# Patient Record
Sex: MALE | Race: WHITE | NOT HISPANIC OR LATINO | Employment: FULL TIME | URBAN - METROPOLITAN AREA
[De-identification: names, ages, dates, MRNs, and addresses within clinical notes are randomized per-mention and may not be internally consistent; named-entity substitution may affect disease eponyms.]

---

## 2020-03-07 ENCOUNTER — OFFICE VISIT (OUTPATIENT)
Dept: URGENT CARE | Facility: URGENT CARE | Age: 55
End: 2020-03-07
Payer: COMMERCIAL

## 2020-03-07 VITALS
DIASTOLIC BLOOD PRESSURE: 78 MMHG | SYSTOLIC BLOOD PRESSURE: 128 MMHG | HEART RATE: 67 BPM | TEMPERATURE: 98.2 F | RESPIRATION RATE: 18 BRPM | WEIGHT: 305.2 LBS | OXYGEN SATURATION: 98 %

## 2020-03-07 DIAGNOSIS — M10.9 ACUTE GOUT INVOLVING TOE OF RIGHT FOOT, UNSPECIFIED CAUSE: Primary | ICD-10-CM

## 2020-03-07 DIAGNOSIS — M79.674 PAIN OF TOE OF RIGHT FOOT: ICD-10-CM

## 2020-03-07 LAB
BASOPHILS # BLD AUTO: 0 10E9/L (ref 0–0.2)
BASOPHILS NFR BLD AUTO: 0.5 %
DIFFERENTIAL METHOD BLD: NORMAL
EOSINOPHIL # BLD AUTO: 0.3 10E9/L (ref 0–0.7)
EOSINOPHIL NFR BLD AUTO: 2.9 %
ERYTHROCYTE [DISTWIDTH] IN BLOOD BY AUTOMATED COUNT: 13.8 % (ref 10–15)
HCT VFR BLD AUTO: 46.7 % (ref 40–53)
HGB BLD-MCNC: 16.1 G/DL (ref 13.3–17.7)
LYMPHOCYTES # BLD AUTO: 1.3 10E9/L (ref 0.8–5.3)
LYMPHOCYTES NFR BLD AUTO: 15.2 %
MCH RBC QN AUTO: 31.7 PG (ref 26.5–33)
MCHC RBC AUTO-ENTMCNC: 34.5 G/DL (ref 31.5–36.5)
MCV RBC AUTO: 92 FL (ref 78–100)
MONOCYTES # BLD AUTO: 1 10E9/L (ref 0–1.3)
MONOCYTES NFR BLD AUTO: 11.6 %
NEUTROPHILS # BLD AUTO: 6 10E9/L (ref 1.6–8.3)
NEUTROPHILS NFR BLD AUTO: 69.8 %
PLATELET # BLD AUTO: 217 10E9/L (ref 150–450)
RBC # BLD AUTO: 5.08 10E12/L (ref 4.4–5.9)
URATE SERPL-MCNC: 8.6 MG/DL (ref 3.5–7.2)
WBC # BLD AUTO: 8.6 10E9/L (ref 4–11)

## 2020-03-07 PROCEDURE — 84550 ASSAY OF BLOOD/URIC ACID: CPT | Performed by: PHYSICIAN ASSISTANT

## 2020-03-07 PROCEDURE — 36415 COLL VENOUS BLD VENIPUNCTURE: CPT | Performed by: PHYSICIAN ASSISTANT

## 2020-03-07 PROCEDURE — 99203 OFFICE O/P NEW LOW 30 MIN: CPT | Performed by: PHYSICIAN ASSISTANT

## 2020-03-07 PROCEDURE — 85025 COMPLETE CBC W/AUTO DIFF WBC: CPT | Performed by: PHYSICIAN ASSISTANT

## 2020-03-07 RX ORDER — HYDROCHLOROTHIAZIDE 25 MG/1
TABLET ORAL
COMMUNITY
Start: 2020-03-07 | End: 2023-05-23

## 2020-03-07 RX ORDER — METOPROLOL SUCCINATE 100 MG/1
50 TABLET, EXTENDED RELEASE ORAL
COMMUNITY
Start: 2020-03-07 | End: 2023-05-23

## 2020-03-07 RX ORDER — INDOMETHACIN 50 MG/1
50 CAPSULE ORAL
Qty: 120 CAPSULE | Refills: 0 | Status: SHIPPED | OUTPATIENT
Start: 2020-03-07 | End: 2023-05-23

## 2020-03-07 RX ORDER — LISINOPRIL 40 MG/1
TABLET ORAL
COMMUNITY
Start: 2020-02-05 | End: 2023-05-23

## 2020-03-07 SDOH — HEALTH STABILITY: MENTAL HEALTH: HOW OFTEN DO YOU HAVE A DRINK CONTAINING ALCOHOL?: MONTHLY OR LESS

## 2020-03-07 ASSESSMENT — ENCOUNTER SYMPTOMS: ARTHRALGIAS: 1

## 2020-03-07 NOTE — PROGRESS NOTES
SUBJECTIVE:   Abbe Fuentes is a 54 year old male presenting with a chief complaint of   Chief Complaint   Patient presents with     Musculoskeletal Problem     x 2 days right big toe has been painful and got worst last night may now be swollen also       He is new patient of [facility].  Patient presents with complaints of right foot big toe pain x 2 days.  No fevers or trauma.  No history of gout.      MS Injury/Pain    Onset of symptoms was 2 day(s) ago.  Location: right toe(s) great  Context:       The injury happened while none      Mechanism: none      Patient experienced   Course of symptoms is same.    Severity moderate  Current and Associated symptoms: Pain, Swelling and Redness  Denies  Bruising  Aggravating Factors: all  Therapies to improve symptoms include: none  This is the first time this type of problem has occurred for this patient.       Review of Systems   Musculoskeletal: Positive for arthralgias.   All other systems reviewed and are negative.      No past medical history on file.  No family history on file.  Current Outpatient Medications   Medication Sig Dispense Refill     CETIRIZINE HCL PO        hydrochlorothiazide (HYDRODIURIL) 25 MG tablet        indomethacin (INDOCIN) 50 MG capsule Take 1 capsule (50 mg) by mouth 3 times daily (with meals) 120 capsule 0     lisinopril (ZESTRIL) 40 MG tablet        metoprolol succinate ER (TOPROL-XL) 100 MG 24 hr tablet 50 mg       Multiple Vitamins-Minerals (MULTIVITAMIN ADULTS PO) Take 1 tablet by mouth       omeprazole (PRILOSEC) 20 MG DR capsule Take 1 capsule by mouth       Social History     Tobacco Use     Smoking status: Former Smoker     Smokeless tobacco: Never Used   Substance Use Topics     Alcohol use: Yes     Frequency: Monthly or less       OBJECTIVE  /78   Pulse 67   Temp 98.2  F (36.8  C) (Tympanic)   Resp 18   Wt 138.4 kg (305 lb 3.2 oz)   SpO2 98%     Physical Exam  Vitals signs and nursing note reviewed.   Constitutional:        Appearance: Normal appearance. He is obese.   Musculoskeletal:      Comments: Full ROM at right foot digits, great toe, DIP mildly erythematous and edematous.  Tender to palpation.  DP pulses present.   Skin:     General: Skin is warm.      Capillary Refill: Capillary refill takes less than 2 seconds.      Findings: Erythema present.   Neurological:      General: No focal deficit present.      Mental Status: He is alert.   Psychiatric:         Mood and Affect: Mood normal.         Labs:  Results for orders placed or performed in visit on 03/07/20 (from the past 24 hour(s))   CBC with platelets and differential   Result Value Ref Range    WBC 8.6 4.0 - 11.0 10e9/L    RBC Count 5.08 4.4 - 5.9 10e12/L    Hemoglobin 16.1 13.3 - 17.7 g/dL    Hematocrit 46.7 40.0 - 53.0 %    MCV 92 78 - 100 fl    MCH 31.7 26.5 - 33.0 pg    MCHC 34.5 31.5 - 36.5 g/dL    RDW 13.8 10.0 - 15.0 %    Platelet Count 217 150 - 450 10e9/L    % Neutrophils 69.8 %    % Lymphocytes 15.2 %    % Monocytes 11.6 %    % Eosinophils 2.9 %    % Basophils 0.5 %    Absolute Neutrophil 6.0 1.6 - 8.3 10e9/L    Absolute Lymphocytes 1.3 0.8 - 5.3 10e9/L    Absolute Monocytes 1.0 0.0 - 1.3 10e9/L    Absolute Eosinophils 0.3 0.0 - 0.7 10e9/L    Absolute Basophils 0.0 0.0 - 0.2 10e9/L    Diff Method Automated Method    Uric acid   Result Value Ref Range    Uric Acid 8.6 (H) 3.5 - 7.2 mg/dL       X-Ray was not done.    ASSESSMENT:      ICD-10-CM    1. Acute gout involving toe of right foot, unspecified cause M10.9 indomethacin (INDOCIN) 50 MG capsule   2. Pain of toe of right foot M79.674 CBC with platelets and differential     Uric acid     indomethacin (INDOCIN) 50 MG capsule        Medical Decision Making:    Differential Diagnosis:  MS Injury Pain: gout    Serious Comorbid Conditions:  Adult:  None    PLAN:    MS Injury/Pain  indomethicine    Followup:    If not improving or if condition worsens, follow up with your Primary Care Provider    Patient Instructions        Patient Education     Treating Gout Attacks     Raising the joint above the level of your heart can help reduce gout symptoms.     Gout is a disease that affects the joints. It is caused by excess uric acid in your blood that may lead to crystals forming in your joints. Left untreated, it can lead to painful foot and joint deformities and even kidney problems. But, by treating gout early, you can relieve pain and help prevent future problems. Gout can usually be treated with medicine and proper diet. In severe cases, surgery may be needed.  Gout attacks are painful and often happen more than once. Taking medicines may reduce pain and prevent attacks in the future. There are also some things you can do at home to relieve symptoms.  Medicines for gout  Your healthcare provider may prescribe a daily medicine to reduce levels of uric acid. Reducing your uric acid levels may help prevent gout attacks. Allopurinol is one commonly used medicine taken daily to reduce uric acid levels. Other daily medicines used to reduce uric acid levels include febuxostat, lesinurad, and probencid. Other medicines can help relieve pain and swelling during an acute attack. Medicines such as NSAIDs (nonsteroidal anti-inflammatory medicines), steroids, and colchicine may be prescribed for intermittent use to relieve an acute gout attack. Be sure to take your medicine as directed.  What you can do  Below are some things you can do at home to relieve gout symptoms. Your healthcare provider may have other tips.    Rest the painful joint as much as you can.    Raise the painful joint so it is at a level higher than your heart.    Use ice for 10 minutes every 1 to 2 hours as possible.  How can I prevent gout?  With a little effort, you may be able to prevent gout attacks in the future. Here are some things you can do:    Don't eat foods high in purines  ? Certain meats (red meat, processed meat, turkey)  ? Organ meats (kidney, liver,  sweetbread)  ? Shellfish (lobster, crab, shrimp, scallop, mussel)  ? Certain fish (anchovy, sardine, herring, mackerel)    Take any medicines prescribed by your healthcare provider.    Lose weight if you need to.    Reduce high fructose corn syrup in meals and drinks.    Reduce or cut out alcohol, particularly beer, but also red wine and spirits.    Control blood pressure, diabetes, and cholesterol.    Drink plenty of water to help flush uric acid from your body.  Date Last Reviewed: 4/1/2018 2000-2019 [employer]. [address] [place] [address]. All rights reserved. This information is not intended as a substitute for professional medical care. Always follow your healthcare professional's instructions.           Patient Education     What is Gout?  Gout is a disease that affects the joints. Left untreated, it can lead to painful foot and joint deformities and even kidney problems. But, by treating gout early, you can relieve pain and help prevent future problems. Gout can usually be treated with medicine and proper diet. In severe cases, surgery may be needed.  What causes gout?  Gout is caused by an excess of uric acid (a waste product made by the body). Uric acid is excreted by the kidneys. If the uric acid level in your blood rises too high, the uric acid may form crystals that collect in the joints, bringing on a gout attack. If you have many gout attacks, crystals may form large deposits called tophi. Tophi can damage joints and cause deformity.  Who is at risk for gout?  Men are more likely to have gout than women. But women can also be affected, mostly after menopause. Some health problems, such as obesity and high cholesterol, make gout more likely. And some medicines, such as diuretics ( water pills ), can trigger a gout attack. People who drink a lot of alcohol are at high risk for gout. Certain foods can also trigger a gout attack.  Substances that may trigger a gout  "attack  To help prevent a gout attack, avoid these foods:    Alcohol (particularly beer, but also red wine and spirits)    Certain meats (red meat, processed meat, turkey)    Organ meats (kidney, liver, sweetbread)    Shellfish (lobster, crab, shrimp, scallop, mussel)    Certain fish (anchovy, sardine, herring, mackerel)   Treatment    Lifestyle changes, including weight loss, exercise, and quitting tobacco use    Reducing consumption of the food groups above as well as high fructose corn syrup, found in many foods including sodas and energy drinks    Changing non-essential medicines that may contribute to gout (such as thiazide diuretics--\"water pills\")    Medicines to reduce the amount of uric acid in the blood, such as allopurinol, probencid, febuxostat, and lesinurad.    Medicines to treat acute gout attacks, including NSAIDs (nonsteroidal anti-inflammatory medicines), steroids, and colchicine    Date Last Reviewed: 4/1/2018 2000-2019 [employer]. [address] [place] [address], [place] [address]. All rights reserved. This information is not intended as a substitute for professional medical care. Always follow your healthcare professional's instructions.                 "

## 2020-03-07 NOTE — PATIENT INSTRUCTIONS
Patient Education     Treating Gout Attacks     Raising the joint above the level of your heart can help reduce gout symptoms.     Gout is a disease that affects the joints. It is caused by excess uric acid in your blood that may lead to crystals forming in your joints. Left untreated, it can lead to painful foot and joint deformities and even kidney problems. But, by treating gout early, you can relieve pain and help prevent future problems. Gout can usually be treated with medicine and proper diet. In severe cases, surgery may be needed.  Gout attacks are painful and often happen more than once. Taking medicines may reduce pain and prevent attacks in the future. There are also some things you can do at home to relieve symptoms.  Medicines for gout  Your healthcare provider may prescribe a daily medicine to reduce levels of uric acid. Reducing your uric acid levels may help prevent gout attacks. Allopurinol is one commonly used medicine taken daily to reduce uric acid levels. Other daily medicines used to reduce uric acid levels include febuxostat, lesinurad, and probencid. Other medicines can help relieve pain and swelling during an acute attack. Medicines such as NSAIDs (nonsteroidal anti-inflammatory medicines), steroids, and colchicine may be prescribed for intermittent use to relieve an acute gout attack. Be sure to take your medicine as directed.  What you can do  Below are some things you can do at home to relieve gout symptoms. Your healthcare provider may have other tips.    Rest the painful joint as much as you can.    Raise the painful joint so it is at a level higher than your heart.    Use ice for 10 minutes every 1 to 2 hours as possible.  How can I prevent gout?  With a little effort, you may be able to prevent gout attacks in the future. Here are some things you can do:    Don't eat foods high in purines  ? Certain meats (red meat, processed meat, turkey)  ? Organ meats (kidney, liver,  sweetbread)  ? Shellfish (lobster, crab, shrimp, scallop, mussel)  ? Certain fish (anchovy, sardine, herring, mackerel)    Take any medicines prescribed by your healthcare provider.    Lose weight if you need to.    Reduce high fructose corn syrup in meals and drinks.    Reduce or cut out alcohol, particularly beer, but also red wine and spirits.    Control blood pressure, diabetes, and cholesterol.    Drink plenty of water to help flush uric acid from your body.  Date Last Reviewed: 4/1/2018 2000-2019 [employer]. [address] [place] [address]. All rights reserved. This information is not intended as a substitute for professional medical care. Always follow your healthcare professional's instructions.           Patient Education     What is Gout?  Gout is a disease that affects the joints. Left untreated, it can lead to painful foot and joint deformities and even kidney problems. But, by treating gout early, you can relieve pain and help prevent future problems. Gout can usually be treated with medicine and proper diet. In severe cases, surgery may be needed.  What causes gout?  Gout is caused by an excess of uric acid (a waste product made by the body). Uric acid is excreted by the kidneys. If the uric acid level in your blood rises too high, the uric acid may form crystals that collect in the joints, bringing on a gout attack. If you have many gout attacks, crystals may form large deposits called tophi. Tophi can damage joints and cause deformity.  Who is at risk for gout?  Men are more likely to have gout than women. But women can also be affected, mostly after menopause. Some health problems, such as obesity and high cholesterol, make gout more likely. And some medicines, such as diuretics ( water pills ), can trigger a gout attack. People who drink a lot of alcohol are at high risk for gout. Certain foods can also trigger a gout attack.  Substances that may trigger a gout  "attack  To help prevent a gout attack, avoid these foods:    Alcohol (particularly beer, but also red wine and spirits)    Certain meats (red meat, processed meat, turkey)    Organ meats (kidney, liver, sweetbread)    Shellfish (lobster, crab, shrimp, scallop, mussel)    Certain fish (anchovy, sardine, herring, mackerel)   Treatment    Lifestyle changes, including weight loss, exercise, and quitting tobacco use    Reducing consumption of the food groups above as well as high fructose corn syrup, found in many foods including sodas and energy drinks    Changing non-essential medicines that may contribute to gout (such as thiazide diuretics--\"water pills\")    Medicines to reduce the amount of uric acid in the blood, such as allopurinol, probencid, febuxostat, and lesinurad.    Medicines to treat acute gout attacks, including NSAIDs (nonsteroidal anti-inflammatory medicines), steroids, and colchicine    Date Last Reviewed: 4/1/2018 2000-2019 [employer]. [address] [place] [address], [place] [address]. All rights reserved. This information is not intended as a substitute for professional medical care. Always follow your healthcare professional's instructions.           "

## 2020-09-29 ENCOUNTER — VIRTUAL VISIT (OUTPATIENT)
Dept: FAMILY MEDICINE | Facility: OTHER | Age: 55
End: 2020-09-29

## 2020-09-29 ENCOUNTER — AMBULATORY - HEALTHEAST (OUTPATIENT)
Dept: FAMILY MEDICINE | Facility: CLINIC | Age: 55
End: 2020-09-29

## 2020-09-29 DIAGNOSIS — Z20.822 SUSPECTED COVID-19 VIRUS INFECTION: ICD-10-CM

## 2020-09-29 NOTE — PROGRESS NOTES
"Date: 2020 15:56:18  Clinician: Janessa Alicea  Clinician NPI: [id number]  Patient: Abbe Fuentes  Patient : 1965  Patient Address: [address] [place] [address]  Patient Phone: [phone]  Visit Protocol: URI  Patient Summary:  Abbe is a 54 year old ( : 1965 ) male who initiated a [facility] Visit for COVID-19 (Coronavirus) evaluation and screening. When asked the question \"Please sign me up to receive news, health information and promotions. \", Abbe responded \"No\".    When asked when his symptoms started, Abbe reported that he does not have any symptoms.   He denies taking antibiotic medication in the past month and having recent facial or sinus surgery in the past 60 days.    Pertinent COVID-19 (Coronavirus) information  In the past 14 days, bAbe has not worked in a congregate living setting.   He does not work or volunteer as healthcare worker or a  and does not work or volunteer in a healthcare facility.   Abbe also has not lived in a congregate living setting in the past 14 days. He does not live with a healthcare worker.   Abbe has had a close contact with a laboratory-confirmed COVID-19 patient in the last 14 days. Additional information about contact with COVID-19 (Coronavirus) patient as reported by the patient (free text): My girlfriend is a health care worker in [place], [place]. She spent the weekend (Thursday-) with me, then on Monday was informed that one of her co workers tested positive. She then got herself tested, and that came back positive.   Patient reported they are not living in the same household with a COVID-19 positive patient.  Patient was in an enclosed space for greater than 15 minutes with a COVID-19 patient.  Since 2019, Abbe and has not had upper respiratory infection or influenza-like illness. Has not been diagnosed with lab-confirmed COVID-19 test   Pertinent medical history  Abbe does not need " a return to work/school note.   Weight: 286 lbs   Abbe does not smoke or use smokeless tobacco.   Weight: 286 lbs    MEDICATIONS: metoprolol succinate oral, lisinopril oral, hydrochlorothiazide oral, atorvastatin oral, aspirin oral, omeprazole oral, cetirizine oral, Daily Multivitamin-Minerals oral, ALLERGIES: NKDA  Clinician Response:  Dear Abbe,   Based on your exposure to COVID-19 (coronavirus), we would like to test you for this virus.  1. Please call [phone] to schedule your visit. Explain that you were referred by [facility] to have a COVID-19 test. Be [facility].  The following will serve as your written order for this COVID Test, ordered by me, for the indication of suspected COVID [Z20.828]: The test will be ordered in [employer], our electronic health record, after you are scheduled. It will show as ordered and authorized by Nikhil Linda MD.  Order: COVID-19 (coronavirus) PCR for ASYMPTOMATIC EXPOSURE testing from [facility].  If you know you have had close contact with someone who tested positive, you should be quarantined for 14 days after this exposure. You should stay in quarantine for the14 days even if the covid test is negative, the optimal time to test after exposure is 5-7 days from the exposure  Quarantine means   What should I do?  For safety, it's very important to follow these rules. Do this for 14 days after the date you were last exposed to the virus..  Stay home and away from others. Don't go to school or anywhere else. Generally quarantine means staying home from work but there are some exceptions to this. Please contact your workplace.   No hugging, kissing or shaking hands.  Don't let anyone visit.  Cover your mouth and nose with a mask, tissue or washcloth to avoid spreading germs.  Wash your hands and face often. Use soap and water.  What are the symptoms of COVID-19?  The most common symptoms are cough, fever and trouble breathing. Less common symptoms  include headache, body aches, fatigue (feeling very tired), chills, sore throat, stuffy or runny nose, diarrhea (loose poop), loss of taste or smell, belly pain, and nausea or vomiting (feeling sick to your stomach or throwing up).  After 14 days, if you have still don't have symptoms, you likely don't have this virus.  If you develop symptoms, follow these guidelines.  If you're normally healthy: Please start another [facility] visit to report your symptoms. Go to [email or web address].  If you have a serious health problem (like cancer, heart failure, an organ transplant or kidney disease): Call your specialty clinic. Let them know that you might have COVID-19.  2. When it's time for your COVID test:  Stay at least 6 feet away from others. (If someone will drive you to your test, stay in the backseat, as far away from the  as you can.)  Cover your mouth and nose with a mask, tissue or washcloth.  Go straight to the testing site. Don't make any stops on the way there or back.  Please note  Caregivers in these groups are at risk for severe illness due to COVID-19:  o People 65 years and older  o People who live in a nursing home or long-term care facility  o People with chronic disease (lung, heart, cancer, diabetes, kidney, liver, immunologic)  o People who have a weakened immune system, including those who:  Are in cancer treatment  Take medicine that weakens the immune system, such as corticosteroids  Had a bone marrow or organ transplant  Have an immune deficiency  Have poorly controlled HIV or AIDS  Are obese (body mass index of 40 or higher)  Smoke regularly  Where can I get more information?   [email or web address] -- About COVID-19: [email or web address]  CDC -- What to Do If You're Sick: www.cdc.gov/coronavirus/2019-ncov/about/steps-when-sick.html  CDC -- Ending Home Isolation: www.cdc.gov/coronavirus/2019-ncov/hcp/disposition-in-home-patients.html  CDC -- Caring for Someone:  www.cdc.gov/coronavirus/2019-ncov/if-you-are-sick/care-for-someone.html  [facility] -- Interim Guidance for Hospital Discharge to Home: www.health.[facility].[place]/diseases/coronavirus/hcp/hospdischarge.pdf  [facility] clinical trials (COVID-19 research studies): clinicalaffairs.[facility]-clinical-trials  Below are the COVID-19 hotlines at the [place] Department of Health ([facility]). Interpreters are available.  For health questions: Call 478-648-6414 or 1-777.232.4881 (7 a.m. to 7 p.m.)  For questions about schools and childcare: Call 926-976-9482 or 1-806.556.9783 (7 a.m. to 7 p.m.)    COVID-19 (Coronavirus) General Information  Because there is currently no vaccine to prevent infection, the best way to protect yourself is to avoid being exposed to this virus. Common symptoms of COVID-19 include but are not limited to fever, cough, and shortness of breath. These symptoms appear 2-14 days after you are exposed to the virus that causes COVID-19. Click here for more information from the CDC on how to protect yourself.  If you are sick with COVID-19 or suspect you are infected with the virus that causes COVID-19, follow the steps here from the CDC to help prevent the disease from spreading to people in your home and community.  Click here for general information from the CDC on testing.  If you develop any of these emergency warning signs for COVID-19, get medical attention immediately:     Trouble breathing    Persistent pain or pressure in the chest    New confusion or inability to arouse    Bluish lips or face      Call your doctor or clinic before going in. Call 911 if you have a medical emergency and notify the  you have or think you may have COVID-19.  For more detailed and up to date information on COVID-19 (Coronavirus), please visit the CDC website.   Diagnosis: Contact with and (suspected) exposure to other viral communicable diseases  Diagnosis ICD: Z20.828

## 2020-09-30 ENCOUNTER — AMBULATORY - HEALTHEAST (OUTPATIENT)
Dept: FAMILY MEDICINE | Facility: CLINIC | Age: 55
End: 2020-09-30

## 2020-09-30 DIAGNOSIS — Z20.822 SUSPECTED COVID-19 VIRUS INFECTION: ICD-10-CM

## 2020-10-02 ENCOUNTER — COMMUNICATION - HEALTHEAST (OUTPATIENT)
Dept: SCHEDULING | Facility: CLINIC | Age: 55
End: 2020-10-02

## 2020-10-05 ENCOUNTER — COMMUNICATION - HEALTHEAST (OUTPATIENT)
Dept: SCHEDULING | Facility: CLINIC | Age: 55
End: 2020-10-05

## 2020-12-06 ENCOUNTER — HEALTH MAINTENANCE LETTER (OUTPATIENT)
Age: 55
End: 2020-12-06

## 2021-09-25 ENCOUNTER — HEALTH MAINTENANCE LETTER (OUTPATIENT)
Age: 56
End: 2021-09-25

## 2022-01-15 ENCOUNTER — HEALTH MAINTENANCE LETTER (OUTPATIENT)
Age: 57
End: 2022-01-15

## 2023-01-07 ENCOUNTER — HEALTH MAINTENANCE LETTER (OUTPATIENT)
Age: 58
End: 2023-01-07

## 2023-02-19 ENCOUNTER — APPOINTMENT (OUTPATIENT)
Dept: GENERAL RADIOLOGY | Facility: CLINIC | Age: 58
End: 2023-02-19
Attending: EMERGENCY MEDICINE
Payer: COMMERCIAL

## 2023-02-19 ENCOUNTER — HOSPITAL ENCOUNTER (EMERGENCY)
Facility: CLINIC | Age: 58
Discharge: HOME | End: 2023-02-19
Attending: EMERGENCY MEDICINE | Admitting: EMERGENCY MEDICINE
Payer: COMMERCIAL

## 2023-02-19 VITALS
DIASTOLIC BLOOD PRESSURE: 82 MMHG | OXYGEN SATURATION: 95 % | HEART RATE: 64 BPM | RESPIRATION RATE: 20 BRPM | SYSTOLIC BLOOD PRESSURE: 128 MMHG | TEMPERATURE: 98.9 F

## 2023-02-19 DIAGNOSIS — J20.9 ACUTE BRONCHITIS WITH SYMPTOMS > 10 DAYS: ICD-10-CM

## 2023-02-19 DIAGNOSIS — J98.01 ACUTE BRONCHOSPASM: ICD-10-CM

## 2023-02-19 LAB
FLUAV RNA SPEC QL NAA+PROBE: NEGATIVE
FLUBV RNA RESP QL NAA+PROBE: NEGATIVE
RSV RNA SPEC NAA+PROBE: NEGATIVE
SARS-COV-2 RNA RESP QL NAA+PROBE: NEGATIVE

## 2023-02-19 PROCEDURE — 87637 SARSCOV2&INF A&B&RSV AMP PRB: CPT | Performed by: EMERGENCY MEDICINE

## 2023-02-19 PROCEDURE — 71046 X-RAY EXAM CHEST 2 VIEWS: CPT

## 2023-02-19 PROCEDURE — 250N000013 HC RX MED GY IP 250 OP 250 PS 637: Performed by: EMERGENCY MEDICINE

## 2023-02-19 PROCEDURE — 99284 EMERGENCY DEPT VISIT MOD MDM: CPT | Mod: CS,25

## 2023-02-19 PROCEDURE — 250N000012 HC RX MED GY IP 250 OP 636 PS 637: Performed by: EMERGENCY MEDICINE

## 2023-02-19 PROCEDURE — C9803 HOPD COVID-19 SPEC COLLECT: HCPCS

## 2023-02-19 PROCEDURE — 94640 AIRWAY INHALATION TREATMENT: CPT

## 2023-02-19 RX ORDER — ALBUTEROL SULFATE 90 UG/1
2-4 AEROSOL, METERED RESPIRATORY (INHALATION) EVERY 4 HOURS PRN
Qty: 8 G | Refills: 1 | Status: SHIPPED | OUTPATIENT
Start: 2023-02-19 | End: 2023-05-23

## 2023-02-19 RX ORDER — PREDNISONE 20 MG/1
40 TABLET ORAL ONCE
Status: COMPLETED | OUTPATIENT
Start: 2023-02-19 | End: 2023-02-19

## 2023-02-19 RX ORDER — PREDNISONE 20 MG/1
40 TABLET ORAL DAILY
Qty: 8 TABLET | Refills: 0 | Status: SHIPPED | OUTPATIENT
Start: 2023-02-19 | End: 2023-02-23

## 2023-02-19 RX ORDER — BENZONATATE 200 MG/1
200 CAPSULE ORAL 3 TIMES DAILY PRN
Qty: 21 CAPSULE | Refills: 0 | Status: SHIPPED | OUTPATIENT
Start: 2023-02-19 | End: 2023-02-26

## 2023-02-19 RX ORDER — ALBUTEROL SULFATE 90 UG/1
4 AEROSOL, METERED RESPIRATORY (INHALATION) ONCE
Status: COMPLETED | OUTPATIENT
Start: 2023-02-19 | End: 2023-02-19

## 2023-02-19 RX ORDER — DOXYCYCLINE 100 MG/1
100 CAPSULE ORAL 2 TIMES DAILY
Qty: 14 CAPSULE | Refills: 0 | Status: SHIPPED | OUTPATIENT
Start: 2023-02-19 | End: 2023-02-26

## 2023-02-19 RX ORDER — DOXYCYCLINE 100 MG/1
100 CAPSULE ORAL ONCE
Status: COMPLETED | OUTPATIENT
Start: 2023-02-19 | End: 2023-02-19

## 2023-02-19 RX ADMIN — DOXYCYCLINE HYCLATE 100 MG: 100 CAPSULE ORAL at 17:16

## 2023-02-19 RX ADMIN — PREDNISONE 40 MG: 20 TABLET ORAL at 17:16

## 2023-02-19 RX ADMIN — ALBUTEROL SULFATE 4 PUFF: 90 AEROSOL, METERED RESPIRATORY (INHALATION) at 17:16

## 2023-02-19 ASSESSMENT — ACTIVITIES OF DAILY LIVING (ADL): ADLS_ACUITY_SCORE: 35

## 2023-02-19 ASSESSMENT — ENCOUNTER SYMPTOMS
SORE THROAT: 1
FEVER: 0
COUGH: 1

## 2023-02-19 NOTE — ED PROVIDER NOTES
History     Chief Complaint:  Cough       HPI   Abbe Fuentes is a 57 year old male with a history of diabetes, HTN, and dyslipidmeia who presents with a cough. The patient states that for the past 10 days he has been sick with a cough. This first began when he woke up from his sleep coughing up a lot of phlegm. He also has a sore throat and lots of congestion. He denies a fever. He denies recent travel. He uses nasal saline for congestion issues.      Independent Historian:   None - Patient Only    Review of External Notes:      ROS:  Review of Systems   Constitutional: Negative for fever.   HENT: Positive for congestion and sore throat.    Respiratory: Positive for cough.    All other systems reviewed and are negative.      Allergies:  No Known Allergies     Medications:    albuterol  benzonatate  doxycycline hyclate   hydrochlorothiazide   indomethacin   lisinopril   metoprolol succinate  omeprazole    Past Medical History:    Type 2 diabetes  Dyslipidemia  Obesity  Seasonal allergies  HTN  SUZETTE  GERD  Hiatal hernia    Past Surgical History:    Adenoidectomy     Family History:    Father-HTN, lung cancer    Social History:  The patient presents to the ED alone.    Physical Exam     Patient Vitals for the past 24 hrs:   BP Temp Temp src Pulse Resp SpO2   02/19/23 1650 128/82 -- -- 64 20 95 %   02/19/23 1643 (!) 146/79 98.9  F (37.2  C) Oral 69 18 96 %        Physical Exam    HEENT:         Normal conjunctiva, No  discharge           R TM  external ear and EAC normal         L  TM ,external ear and EAC normal           Posterior oropharynx:               Yes erythema,               No exudates,               Tonsillar hypertrophy - no              uvula midline - Yes    Neck: no adenopathy      Lungs:     Frequent cough, scattered expiratory wheezes and prolonged expiratory phase    Heart: Regular, no m/r/g, ppi    Neuro: alert, no focal gross focal deficits    Psych: Normal mood and  affect            Emergency Department Course     Imaging:  Chest XR,  PA & LAT   Final Result   IMPRESSION: Heart is enlarged. The lungs are clear. No infiltrate, pleural effusion, or pneumothorax.         Report per radiology    Laboratory:  Labs Ordered and Resulted from Time of ED Arrival to Time of ED Departure   INFLUENZA A/B & SARS-COV2 PCR MULTIPLEX - Normal       Result Value    Influenza A PCR Negative      Influenza B PCR Negative      RSV PCR Negative      SARS CoV2 PCR Negative        Emergency Department Course & Assessments:    Interventions:  Medications   albuterol (PROVENTIL HFA/VENTOLIN HFA) inhaler (4 puffs Inhalation Given 23)   predniSONE (DELTASONE) tablet 40 mg (40 mg Oral Given 23)   doxycycline hyclate (VIBRAMYCIN) capsule 100 mg (100 mg Oral Given 23)        Independent Interpretation (X-rays, CTs, rhythm strip):  Chest radiograph without lobar opacity, pleural effusion, pneumothorax    Social Determinants of Health affecting care:   None    Assessments:  1620 Obtained the patients history and performed initial exam    Disposition:  The patient was discharged to home.     Impression & Plan      Medical Decision Makin-year-old gentleman here with a prolonged course of respiratory tract infectious symptoms.  Radiograph negative for lobar pneumonia here.  Evidence of bronchospasm on examination.  Discharged home with albuterol and symptomatic cares and after shared decision-making conversation course of doxycycline for possible atypical pneumonia.    Diagnosis:    ICD-10-CM    1. Acute bronchitis with symptoms > 10 days  J20.9       2. Acute bronchospasm  J98.01            Discharge Medications:  New Prescriptions    ALBUTEROL (PROAIR HFA/PROVENTIL HFA/VENTOLIN HFA) 108 (90 BASE) MCG/ACT INHALER    Inhale 2-4 puffs into the lungs every 4 hours as needed for shortness of breath, wheezing or cough    BENZONATATE (TESSALON) 200 MG CAPSULE    Take 1 capsule  (200 mg) by mouth 3 times daily as needed for cough    DOXYCYCLINE HYCLATE (VIBRAMYCIN) 100 MG CAPSULE    Take 1 capsule (100 mg) by mouth 2 times daily for 7 days    PREDNISONE (DELTASONE) 20 MG TABLET    Take 2 tablets (40 mg) by mouth daily for 4 days          Scribe Disclosure:  TUAN Tang Shalonda, am serving as a scribe at 4:37 PM on 2/19/2023 to document services personally performed by Hemal Samuels MD based on my observations and the provider's statements to me.   2/19/2023   Hemal Samuels MD Walker, Jerome Richard, MD  02/20/23 0119

## 2023-02-19 NOTE — ED TRIAGE NOTES
Patient presents to the ED reporting a congested cough x 10 days. States has been coughing so much is having pain in the muscles of the abdomen and has been feeling short of breath.

## 2023-04-01 ENCOUNTER — TRANSFERRED RECORDS (OUTPATIENT)
Dept: MULTI SPECIALTY CLINIC | Facility: CLINIC | Age: 58
End: 2023-04-01

## 2023-04-01 LAB — RETINOPATHY: NORMAL

## 2023-05-08 ENCOUNTER — OFFICE VISIT (OUTPATIENT)
Dept: URGENT CARE | Facility: URGENT CARE | Age: 58
End: 2023-05-08
Payer: COMMERCIAL

## 2023-05-08 VITALS
OXYGEN SATURATION: 100 % | TEMPERATURE: 98 F | DIASTOLIC BLOOD PRESSURE: 86 MMHG | WEIGHT: 305 LBS | RESPIRATION RATE: 22 BRPM | HEART RATE: 70 BPM | SYSTOLIC BLOOD PRESSURE: 134 MMHG

## 2023-05-08 DIAGNOSIS — R05.9 COUGH, UNSPECIFIED TYPE: Primary | ICD-10-CM

## 2023-05-08 DIAGNOSIS — B96.89 ACUTE BACTERIAL BRONCHITIS: ICD-10-CM

## 2023-05-08 DIAGNOSIS — J20.8 ACUTE BACTERIAL BRONCHITIS: ICD-10-CM

## 2023-05-08 DIAGNOSIS — R07.89 CHEST TIGHTNESS: ICD-10-CM

## 2023-05-08 LAB
FLUAV AG SPEC QL IA: NEGATIVE
FLUBV AG SPEC QL IA: NEGATIVE
SARS-COV-2 RNA RESP QL NAA+PROBE: NEGATIVE

## 2023-05-08 PROCEDURE — U0003 INFECTIOUS AGENT DETECTION BY NUCLEIC ACID (DNA OR RNA); SEVERE ACUTE RESPIRATORY SYNDROME CORONAVIRUS 2 (SARS-COV-2) (CORONAVIRUS DISEASE [COVID-19]), AMPLIFIED PROBE TECHNIQUE, MAKING USE OF HIGH THROUGHPUT TECHNOLOGIES AS DESCRIBED BY CMS-2020-01-R: HCPCS | Performed by: PHYSICIAN ASSISTANT

## 2023-05-08 PROCEDURE — U0005 INFEC AGEN DETEC AMPLI PROBE: HCPCS | Performed by: PHYSICIAN ASSISTANT

## 2023-05-08 PROCEDURE — 99214 OFFICE O/P EST MOD 30 MIN: CPT | Mod: CS | Performed by: PHYSICIAN ASSISTANT

## 2023-05-08 PROCEDURE — 87804 INFLUENZA ASSAY W/OPTIC: CPT | Performed by: PHYSICIAN ASSISTANT

## 2023-05-08 RX ORDER — CODEINE PHOSPHATE AND GUAIFENESIN 10; 100 MG/5ML; MG/5ML
1-2 SOLUTION ORAL EVERY 6 HOURS PRN
Qty: 118 ML | Refills: 0 | Status: SHIPPED | OUTPATIENT
Start: 2023-05-08 | End: 2023-05-23

## 2023-05-08 RX ORDER — AZITHROMYCIN 250 MG/1
TABLET, FILM COATED ORAL
Qty: 6 TABLET | Refills: 0 | Status: SHIPPED | OUTPATIENT
Start: 2023-05-08 | End: 2023-05-13

## 2023-05-08 RX ORDER — BENZONATATE 200 MG/1
200 CAPSULE ORAL 3 TIMES DAILY PRN
Qty: 21 CAPSULE | Refills: 0 | Status: SHIPPED | OUTPATIENT
Start: 2023-05-08 | End: 2023-05-15

## 2023-05-08 RX ORDER — ALBUTEROL SULFATE 90 UG/1
2 AEROSOL, METERED RESPIRATORY (INHALATION) EVERY 6 HOURS
Qty: 8.5 G | Refills: 0
Start: 2023-05-08 | End: 2023-08-07

## 2023-05-08 NOTE — PROGRESS NOTES
Assessment & Plan     Cough, unspecified type    Influenza neg  covid pending    Tessalon for coughing  Robitussin ac for night time coughing as this has been keeping him up at night    - Influenza A & B Antigen - Clinic Collect  - Symptomatic COVID-19 Virus (Coronavirus) by PCR Nose  - benzonatate (TESSALON) 200 MG capsule; Take 1 capsule (200 mg) by mouth 3 times daily as needed  - guaiFENesin-codeine (ROBITUSSIN AC) 100-10 MG/5ML solution; Take 5-10 mLs by mouth every 6 hours as needed for cough    Acute bacterial bronchitis    Bronchitis is an infection of the air passages (bronchial tubes) in your lungs. It often occurs when you have a cold. This illness is contagious during the first few days and is spread through the air by coughing and sneezing, or by direct contact (touching the sick person and then touching your own eyes, nose, or mouth).  Symptoms of bronchitis include cough with mucus (phlegm) and low-grade fever. Bronchitis usually lasts 7 to 14 days. Mild cases can be treated with simple home remedies. More severe infection is treated with an antibiotic.    - azithromycin (ZITHROMAX) 250 MG tablet; Take 2 tablets (500 mg) by mouth daily for 1 day, THEN 1 tablet (250 mg) daily for 4 days.    Chest tightness    Continue to use albuterol for wheezing and chest tightness  - albuterol (PROVENTIL HFA) 108 (90 Base) MCG/ACT inhaler; Inhale 2 puffs into the lungs every 6 hours    Review of external notes as documented elsewhere in note       At today's visit with Abbe Fuentes , we discussed results, diagnosis, medications and formulated a plan.  We also discussed red flags for immediate return to clinic/ER, as well as indications for follow up with PCP if not improved in 3 days. Patient understood and agreed to plan. Abbe Fuentes was discharged with stable vitals and has no further questions.       No follow-ups on file.    Danny Tucker, [facility], PA-C  [facility]  SANGEETA Mcadams is a 57 year old, presenting for the following health issues:  Cough and Sinus Problem         View : No data to display.              HPI   Review of Systems   Constitutional, HEENT, cardiovascular, pulmonary, GI, , musculoskeletal, neuro, skin, endocrine and psych systems are negative, except as otherwise noted.      Objective    /86   Pulse 70   Temp 98  F (36.7  C)   Resp 22   Wt 138.3 kg (305 lb)   SpO2 100%   There is no height or weight on file to calculate BMI.  Physical Exam   GENERAL: healthy, alert and no distress  EYES: Eyes grossly normal to inspection, PERRL and conjunctivae and sclerae normal  HENT: ear canals and TM's normal, nose and mouth without ulcers or lesions  NECK: no adenopathy, no asymmetry, masses, or scars and thyroid normal to palpation  RESP: positive for mild wheezing and bronchospasms    CV: regular rate and rhythm, normal S1 S2, no S3 or S4, no murmur, click or rub, no peripheral edema and peripheral pulses strong  MS: no gross musculoskeletal defects noted, no edema  SKIN: no suspicious lesions or rashes  NEURO: Normal strength and tone, mentation intact and speech normal  PSYCH: mentation appears normal, affect normal/bright      No results found for any visits on 05/08/23.        Results for orders placed or performed in visit on 05/08/23   Influenza A & B Antigen - Clinic Collect     Status: Normal    Specimen: Nasopharyngeal; Swab   Result Value Ref Range    Influenza A antigen Negative Negative    Influenza B antigen Negative Negative    Narrative    Test results must be correlated with clinical data. If necessary, results should be confirmed by a molecular assay or viral culture.

## 2023-05-23 ENCOUNTER — OFFICE VISIT (OUTPATIENT)
Dept: INTERNAL MEDICINE | Facility: CLINIC | Age: 58
End: 2023-05-23
Payer: COMMERCIAL

## 2023-05-23 VITALS
SYSTOLIC BLOOD PRESSURE: 136 MMHG | TEMPERATURE: 96.5 F | BODY MASS INDEX: 41.75 KG/M2 | WEIGHT: 315 LBS | RESPIRATION RATE: 16 BRPM | HEIGHT: 73 IN | HEART RATE: 65 BPM | DIASTOLIC BLOOD PRESSURE: 78 MMHG | OXYGEN SATURATION: 97 %

## 2023-05-23 DIAGNOSIS — Z30.2 ENCOUNTER FOR VASECTOMY: ICD-10-CM

## 2023-05-23 DIAGNOSIS — I48.91 ATRIAL FIBRILLATION BY ELECTROCARDIOGRAM (H): Primary | ICD-10-CM

## 2023-05-23 DIAGNOSIS — E11.65 TYPE 2 DIABETES MELLITUS WITH HYPERGLYCEMIA, WITH LONG-TERM CURRENT USE OF INSULIN (H): ICD-10-CM

## 2023-05-23 DIAGNOSIS — Z79.4 TYPE 2 DIABETES MELLITUS WITH HYPERGLYCEMIA, WITH LONG-TERM CURRENT USE OF INSULIN (H): ICD-10-CM

## 2023-05-23 DIAGNOSIS — Z12.5 SCREENING FOR PROSTATE CANCER: ICD-10-CM

## 2023-05-23 DIAGNOSIS — I10 ESSENTIAL HYPERTENSION: ICD-10-CM

## 2023-05-23 DIAGNOSIS — E66.01 MORBID OBESITY (H): ICD-10-CM

## 2023-05-23 DIAGNOSIS — R41.840 INATTENTION: ICD-10-CM

## 2023-05-23 DIAGNOSIS — E78.5 DYSLIPIDEMIA: ICD-10-CM

## 2023-05-23 PROBLEM — G47.33 OSA (OBSTRUCTIVE SLEEP APNEA): Status: ACTIVE | Noted: 2023-05-23

## 2023-05-23 PROBLEM — E11.9 TYPE 2 DIABETES MELLITUS WITHOUT COMPLICATION, WITH LONG-TERM CURRENT USE OF INSULIN (H): Status: ACTIVE | Noted: 2022-05-25

## 2023-05-23 LAB
ANION GAP SERPL CALCULATED.3IONS-SCNC: 13 MMOL/L (ref 7–15)
BUN SERPL-MCNC: 7.8 MG/DL (ref 6–20)
CALCIUM SERPL-MCNC: 9.7 MG/DL (ref 8.6–10)
CHLORIDE SERPL-SCNC: 99 MMOL/L (ref 98–107)
CHOLEST SERPL-MCNC: 161 MG/DL
CREAT SERPL-MCNC: 0.9 MG/DL (ref 0.67–1.17)
DEPRECATED HCO3 PLAS-SCNC: 25 MMOL/L (ref 22–29)
GFR SERPL CREATININE-BSD FRML MDRD: >90 ML/MIN/1.73M2
GLUCOSE SERPL-MCNC: 208 MG/DL (ref 70–99)
HBA1C MFR BLD: 9.4 % (ref 0–5.6)
HDLC SERPL-MCNC: 38 MG/DL
LDLC SERPL CALC-MCNC: 86 MG/DL
NONHDLC SERPL-MCNC: 123 MG/DL
POTASSIUM SERPL-SCNC: 4.1 MMOL/L (ref 3.4–5.3)
PSA SERPL DL<=0.01 NG/ML-MCNC: 0.61 NG/ML (ref 0–3.5)
SODIUM SERPL-SCNC: 137 MMOL/L (ref 136–145)
TRIGL SERPL-MCNC: 183 MG/DL

## 2023-05-23 PROCEDURE — 93000 ELECTROCARDIOGRAM COMPLETE: CPT | Performed by: INTERNAL MEDICINE

## 2023-05-23 PROCEDURE — 80048 BASIC METABOLIC PNL TOTAL CA: CPT | Performed by: INTERNAL MEDICINE

## 2023-05-23 PROCEDURE — 99215 OFFICE O/P EST HI 40 MIN: CPT | Performed by: INTERNAL MEDICINE

## 2023-05-23 PROCEDURE — 83036 HEMOGLOBIN GLYCOSYLATED A1C: CPT | Performed by: INTERNAL MEDICINE

## 2023-05-23 PROCEDURE — 80061 LIPID PANEL: CPT | Performed by: INTERNAL MEDICINE

## 2023-05-23 PROCEDURE — G0103 PSA SCREENING: HCPCS | Performed by: INTERNAL MEDICINE

## 2023-05-23 PROCEDURE — 36415 COLL VENOUS BLD VENIPUNCTURE: CPT | Performed by: INTERNAL MEDICINE

## 2023-05-23 RX ORDER — METOPROLOL SUCCINATE 50 MG/1
50 TABLET, EXTENDED RELEASE ORAL DAILY
Qty: 90 TABLET | Refills: 4 | Status: SHIPPED | OUTPATIENT
Start: 2023-05-23 | End: 2023-10-19

## 2023-05-23 RX ORDER — ATORVASTATIN CALCIUM 10 MG/1
10 TABLET, FILM COATED ORAL EVERY MORNING
COMMUNITY
Start: 2023-05-16 | End: 2023-05-23

## 2023-05-23 RX ORDER — ATORVASTATIN CALCIUM 10 MG/1
10 TABLET, FILM COATED ORAL EVERY MORNING
Qty: 90 TABLET | Refills: 4 | Status: SHIPPED | OUTPATIENT
Start: 2023-05-23 | End: 2024-07-19

## 2023-05-23 RX ORDER — LISINOPRIL 40 MG/1
40 TABLET ORAL DAILY
Qty: 90 TABLET | Refills: 4 | Status: SHIPPED | OUTPATIENT
Start: 2023-05-23 | End: 2023-05-31

## 2023-05-23 RX ORDER — HYDROCHLOROTHIAZIDE 25 MG/1
25 TABLET ORAL DAILY
Qty: 90 TABLET | Refills: 4 | Status: SHIPPED | OUTPATIENT
Start: 2023-05-23 | End: 2024-06-13

## 2023-05-23 NOTE — PATIENT INSTRUCTIONS
- I will send you a message on [employer] [facility] when I am able to look at the results of your tests from today

## 2023-05-23 NOTE — PROGRESS NOTES
Assessment & Plan   Atrial fibrillation (H)  New diagnosis as of today, though not necessarily new onset as it sounds like anesthesia discuss this with him last year (unfortunately their notes from that day do not mention it). EKG done in office today and reviewed by myself showing a-fib today. Discussed anatomy and pathophysiology of this condition. He is rate controlled on metoprolol already, continue. Discussed anticoagulation and risk of stroke. His CHADS2-VASC is 2. He's on baby aspirin. Recommended he start AC, he'd like to discuss with cardiology first. Referral placed. Reviewed ER precautions.  - Adult Leadless EKG Monitor 3 to 7 Days; Future  - EKG 12-lead complete w/read - Clinics    Type 2 diabetes mellitus with hyperglycemia, with long-term current use of insulin (H)  A1c in prediabetes range for a few years. Controlled with lifestyle/diet. Will repeat labs today.  - Hemoglobin A1c; Future  - Albumin Random Urine Quantitative with Creat Ratio; Future    Essential hypertension  BP at goal. Continue current medications.  - hydrochlorothiazide (HYDRODIURIL) 25 MG tablet; Take 1 tablet (25 mg) by mouth daily  - lisinopril (ZESTRIL) 40 MG tablet; Take 1 tablet (40 mg) by mouth daily  - metoprolol succinate ER (TOPROL XL) 50 MG 24 hr tablet; Take 1 tablet (50 mg) by mouth daily  - Basic metabolic panel; Future    Dyslipidemia  Labs today. Continue atorvastatin.  - atorvastatin (LIPITOR) 10 MG tablet; Take 1 tablet (10 mg) by mouth every morning  - Lipid panel reflex to direct LDL Non-fasting; Future    Encounter for vasectomy  Referral placed for vasectomy consultation per patient request.  - Adult Urology  Referral; Future    Inattention  Referral placed for ADHD assessment per patient request.  - Adult Mental Health  Referral; Future    Morbid obesity (H)  BMI 42. Weight loss would help with hypertension control.    Screening for prostate cancer  PSA WNL in past.  - PSA Screen;  "Future    Davon Hernandes MD  [facility]    I spent between 41-54 minutes on the date of the encounter doing chart review, history and exam, documentation and further activities as noted above    Subjective   Abbe is a 57 year old who presents with a number of concerns today, both acute and chronic. This is the first time I have met Abbe. He reports he was told by the anesthesiologist during his colonoscopy last year that he has an arrhythmia. He's unclear what arrhythmia. Sometimes his Apple Watch tells him he's in an arrhythmia. He does not feel any symptoms. He's wondering about ADHD evaluation and vasectomy referral.    Review of Systems   Constitutional, cardiovascular, gu, psych systems are negative, except as otherwise noted.      Objective    /78   Pulse 65   Temp (!) 96.5  F (35.8  C) (Temporal)   Ht 1.854 m (6' 1\")   Wt 144.9 kg (319 lb 8 oz)   SpO2 97%   BMI 42.15 kg/m    Body mass index is 42.15 kg/m .     Physical Exam   GENERAL: alert and in no distress.  EYES: conjunctivae/corneas clear. EOMs grossly intact  HENT: Facies symmetric.  RESP: CTAB, no w/r/r.  CV: RRR, no m/r/g.  MSK: Moves all four extremities freely.  SKIN: No significant ulcers, lesions, or rashes on the visualized portions of the skin  NEURO: CN II-XII grossly intact.  "

## 2023-05-31 ENCOUNTER — TELEPHONE (OUTPATIENT)
Dept: INTERNAL MEDICINE | Facility: CLINIC | Age: 58
End: 2023-05-31
Payer: COMMERCIAL

## 2023-05-31 DIAGNOSIS — I10 ESSENTIAL HYPERTENSION: ICD-10-CM

## 2023-05-31 RX ORDER — LISINOPRIL 20 MG/1
20 TABLET ORAL DAILY
Qty: 90 TABLET | Refills: 3 | Status: SHIPPED | OUTPATIENT
Start: 2023-05-31 | End: 2024-06-13

## 2023-05-31 NOTE — TELEPHONE ENCOUNTER
Dr. Mckinnon, patient is calling and states he gave the wrong dosage on his lisinopril.     He was taking only 20 mg of the Lisinopril and not 40 mg. .     Can you please make the correction on his medication list and send the 20 mg refill to his pharmacy?     The pharmacy is  [facility] in [place].       Alba Pederson RN  [facility]

## 2023-06-28 ENCOUNTER — LAB (OUTPATIENT)
Dept: LAB | Facility: CLINIC | Age: 58
End: 2023-06-28
Payer: COMMERCIAL

## 2023-06-28 ENCOUNTER — OFFICE VISIT (OUTPATIENT)
Dept: CARDIOLOGY | Facility: CLINIC | Age: 58
End: 2023-06-28
Payer: COMMERCIAL

## 2023-06-28 ENCOUNTER — HOSPITAL ENCOUNTER (OUTPATIENT)
Dept: CARDIOLOGY | Facility: CLINIC | Age: 58
Discharge: HOME | End: 2023-06-28
Attending: INTERNAL MEDICINE | Admitting: INTERNAL MEDICINE
Payer: COMMERCIAL

## 2023-06-28 VITALS
WEIGHT: 315 LBS | HEART RATE: 64 BPM | BODY MASS INDEX: 41.75 KG/M2 | SYSTOLIC BLOOD PRESSURE: 118 MMHG | DIASTOLIC BLOOD PRESSURE: 78 MMHG | HEIGHT: 73 IN

## 2023-06-28 DIAGNOSIS — I48.91 ATRIAL FIBRILLATION BY ELECTROCARDIOGRAM (H): ICD-10-CM

## 2023-06-28 LAB — TSH SERPL DL<=0.005 MIU/L-ACNC: 1.49 UIU/ML (ref 0.3–4.2)

## 2023-06-28 PROCEDURE — 93244 EXT ECG>48HR<7D REV&INTERPJ: CPT | Performed by: INTERNAL MEDICINE

## 2023-06-28 PROCEDURE — 84443 ASSAY THYROID STIM HORMONE: CPT | Performed by: INTERNAL MEDICINE

## 2023-06-28 PROCEDURE — 36415 COLL VENOUS BLD VENIPUNCTURE: CPT | Performed by: INTERNAL MEDICINE

## 2023-06-28 PROCEDURE — 99204 OFFICE O/P NEW MOD 45 MIN: CPT | Performed by: INTERNAL MEDICINE

## 2023-06-28 PROCEDURE — 93242 EXT ECG>48HR<7D RECORDING: CPT

## 2023-06-28 PROCEDURE — 93000 ELECTROCARDIOGRAM COMPLETE: CPT | Performed by: INTERNAL MEDICINE

## 2023-06-28 NOTE — PROGRESS NOTES
Electrophysiology/ Clinic Note         H&P and Plan:     REASON FOR VISIT: Electrophysiology evaluation.      HISTORY OF PRESENT ILLNESS: Patient is a pleasant 57-year-old gentleman with history hypertension, hyperlipidemia, diabetes, obesity and obstructive sleep apnea, who was recently found to have persistent atrial fibrillation and was referred here for evaluation.    Patient recently establish care with new primary care physician.  At that time, an EKG confirmed atrial fibrillation. He was then referred here for evaluation.        Today, he informs he is doing well.  He seems to be asymptomatic with A-fib.  He denies any symptoms of chest pain, palpitation, lightheadedness, near-syncope or syncope.    Of note, he noticed that his wrist heart rate monitor has not been able to  his heart rate for a long time (over a year), which it was probably related to the presence of atrial fibrillation.  Additionally, he informs that he had a colonoscopy done last year and was found to be in A-fib at that time.      EKG showed atrial fibrillation with underlying right bundle branch block.  Heart rate was around 69 bpm.      ASSESSMENT AND PLAN:   1. Persistent atrial fibrillation.  He seems to be asymptomatic with atrial fibrillation and based on the history is very likely that he has been in A-fib for over a year.  Therefore, rhythm control strategy will be challenging.  I recommend the following:  -Continue rate control strategy with metoprolol.   -Echocardiogram.   -Zio patch monitor for week.   -TSH/FT4.    He will follow-up in a month to reevaluate case.  It is very likely that we will continue rate control strategy due to the fact that he seems to be asymptomatic and A-fib appears to be persistent for a long time.    2.  Embolic prevention.  CHADS-VASc score of 2.  I favor starting oral anticoagulation.  I recommend stopping aspirin and starting Eliquis.  3.  Hypertension.  Blood pressure is well controlled.   "Continue therapy with hydrochlorothiazide, lisinopril and metoprolol.      Jaime Otero MD    Physical Exam:  Vitals: /78   Pulse 64   Ht 1.854 m (6' 1\")   Wt 143.8 kg (317 lb)   BMI 41.82 kg/m      Constitutional:  AAO x3.  Pt is in NAD.  HEAD: normocephalic.  SKIN: Skin normal color, texture and turgor with no lesions or eruptions.  Eyes: PERRL, EOMI.  ENT:  Supple, normal JVP. No lymphadenopathy or thyroid enlargement.  Chest:  CTAB.  Cardiac:   RRR, normal  S1 and S2.  No murmurs rubs or gallop.   Abdomen:  Normal BS.  Soft, non-tender and non-distended.  No rebound or guarding.    Extremities:  Pedious pulses palpable B/L.  No LE edema noticed.   Neurological: Strength and sensation grossly symmetric and intact throughout.       CURRENT MEDICATIONS:  Current Outpatient Medications   Medication Sig Dispense Refill     albuterol (PROVENTIL HFA) 108 (90 Base) MCG/ACT inhaler Inhale 2 puffs into the lungs every 6 hours 8.5 g 0     atorvastatin (LIPITOR) 10 MG tablet Take 1 tablet (10 mg) by mouth every morning 90 tablet 4     CETIRIZINE HCL PO        hydrochlorothiazide (HYDRODIURIL) 25 MG tablet Take 1 tablet (25 mg) by mouth daily 90 tablet 4     lisinopril (ZESTRIL) 20 MG tablet Take 1 tablet (20 mg) by mouth daily 90 tablet 3     metoprolol succinate ER (TOPROL XL) 50 MG 24 hr tablet Take 1 tablet (50 mg) by mouth daily 90 tablet 4     Multiple Vitamins-Minerals (MULTIVITAMIN ADULTS PO) Take 1 tablet by mouth       omeprazole (PRILOSEC) 20 MG DR capsule Take 1 capsule by mouth         ALLERGIES   No Known Allergies    PAST MEDICAL HISTORY:  No past medical history on file.    PAST SURGICAL HISTORY:  No past surgical history on file.    FAMILY HISTORY:  The patient's family history was reviewed and is non-contributory for heart disease.    SOCIAL HISTORY:  Social History     Socioeconomic History     Marital status:    Tobacco Use     Smoking status: Former     Smokeless tobacco: Never "   Substance and Sexual Activity     Alcohol use: Yes     Drug use: Never     Sexual activity: Yes     Partners: Female     Birth control/protection: Condom       Review of Systems:  Skin:        Eyes:       ENT:       Respiratory:       Cardiovascular:       Gastroenterology:      Genitourinary:       Musculoskeletal:       Neurologic:       Psychiatric:       Heme/Lymph/Imm:       Endocrine:           Recent Lab Results:  LIPID RESULTS:  Lab Results   Component Value Date    CHOL 161 05/23/2023    HDL 38 (L) 05/23/2023    LDL 86 05/23/2023    TRIG 183 (H) 05/23/2023       LIVER ENZYME RESULTS:  No results found for: AST, ALT    CBC RESULTS:  Lab Results   Component Value Date    WBC 8.6 03/07/2020    RBC 5.08 03/07/2020    HGB 16.1 03/07/2020    HCT 46.7 03/07/2020    MCV 92 03/07/2020    MCH 31.7 03/07/2020    MCHC 34.5 03/07/2020    RDW 13.8 03/07/2020     03/07/2020       BMP RESULTS:  Lab Results   Component Value Date     05/23/2023    POTASSIUM 4.1 05/23/2023    CHLORIDE 99 05/23/2023    CO2 25 05/23/2023    ANIONGAP 13 05/23/2023     (H) 05/23/2023    BUN 7.8 05/23/2023    CR 0.90 05/23/2023    GFRESTIMATED >90 05/23/2023    BRIDGETTE 9.7 05/23/2023        A1C RESULTS:  Lab Results   Component Value Date    A1C 9.4 (H) 05/23/2023       INR RESULTS:  No results found for: INR      ECHOCARDIOGRAM  No results found for this or any previous visit (from the past 8760 hour(s)).      No orders of the defined types were placed in this encounter.    No orders of the defined types were placed in this encounter.    There are no discontinued medications.      Encounter Diagnosis   Name Primary?     Atrial fibrillation by electrocardiogram (H)          CC  Davon Mckinnon MD  [address] [place] [address]

## 2023-06-28 NOTE — LETTER
6/28/2023    Physician No Ref-Primary  No address on file    RE: Abbe MARTINEZ Alfredo       Dear Colleague,     I had the pleasure of seeing Abbe MARTINEZ Alfredo in the [facility].  Electrophysiology/ Clinic Note         H&P and Plan:     REASON FOR VISIT: Electrophysiology evaluation.      HISTORY OF PRESENT ILLNESS: Patient is a pleasant 57-year-old gentleman with history hypertension, hyperlipidemia, diabetes, obesity and obstructive sleep apnea, who was recently found to have persistent atrial fibrillation and was referred here for evaluation.    Patient recently establish care with new primary care physician.  At that time, an EKG confirmed atrial fibrillation. He was then referred here for evaluation.        Today, he informs he is doing well.  He seems to be asymptomatic with A-fib.  He denies any symptoms of chest pain, palpitation, lightheadedness, near-syncope or syncope.    Of note, he noticed that his wrist heart rate monitor has not been able to  his heart rate for a long time (over a year), which it was probably related to the presence of atrial fibrillation.  Additionally, he informs that he had a colonoscopy done last year and was found to be in A-fib at that time.      EKG showed atrial fibrillation with underlying right bundle branch block.  Heart rate was around 69 bpm.      ASSESSMENT AND PLAN:   1. Persistent atrial fibrillation.  He seems to be asymptomatic with atrial fibrillation and based on the history is very likely that he has been in A-fib for over a year.  Therefore, rhythm control strategy will be challenging.  I recommend the following:  -Continue rate control strategy with metoprolol.   -Echocardiogram.   -Zio patch monitor for week.   -TSH/FT4.    He will follow-up in a month to reevaluate case.  It is very likely that we will continue rate control strategy due to the fact that he seems to be asymptomatic and A-fib appears to be persistent for a long time.    2.   "Embolic prevention.  CHADS-VASc score of 2.  I favor starting oral anticoagulation.  I recommend stopping aspirin and starting Eliquis.  3.  Hypertension.  Blood pressure is well controlled.  Continue therapy with hydrochlorothiazide, lisinopril and metoprolol.      Jaime Otero MD    Physical Exam:  Vitals: /78   Pulse 64   Ht 1.854 m (6' 1\")   Wt 143.8 kg (317 lb)   BMI 41.82 kg/m      Constitutional:  AAO x3.  Pt is in NAD.  HEAD: normocephalic.  SKIN: Skin normal color, texture and turgor with no lesions or eruptions.  Eyes: PERRL, EOMI.  ENT:  Supple, normal JVP. No lymphadenopathy or thyroid enlargement.  Chest:  CTAB.  Cardiac:   RRR, normal  S1 and S2.  No murmurs rubs or gallop.   Abdomen:  Normal BS.  Soft, non-tender and non-distended.  No rebound or guarding.    Extremities:  Pedious pulses palpable B/L.  No LE edema noticed.   Neurological: Strength and sensation grossly symmetric and intact throughout.       CURRENT MEDICATIONS:  Current Outpatient Medications   Medication Sig Dispense Refill    albuterol (PROVENTIL HFA) 108 (90 Base) MCG/ACT inhaler Inhale 2 puffs into the lungs every 6 hours 8.5 g 0    atorvastatin (LIPITOR) 10 MG tablet Take 1 tablet (10 mg) by mouth every morning 90 tablet 4    CETIRIZINE HCL PO       hydrochlorothiazide (HYDRODIURIL) 25 MG tablet Take 1 tablet (25 mg) by mouth daily 90 tablet 4    lisinopril (ZESTRIL) 20 MG tablet Take 1 tablet (20 mg) by mouth daily 90 tablet 3    metoprolol succinate ER (TOPROL XL) 50 MG 24 hr tablet Take 1 tablet (50 mg) by mouth daily 90 tablet 4    Multiple Vitamins-Minerals (MULTIVITAMIN ADULTS PO) Take 1 tablet by mouth      omeprazole (PRILOSEC) 20 MG DR capsule Take 1 capsule by mouth         ALLERGIES   No Known Allergies    PAST MEDICAL HISTORY:  No past medical history on file.    PAST SURGICAL HISTORY:  No past surgical history on file.    FAMILY HISTORY:  The patient's family history was reviewed and is non-contributory " for heart disease.    SOCIAL HISTORY:  Social History     Socioeconomic History    Marital status:    Tobacco Use    Smoking status: Former    Smokeless tobacco: Never   Substance and Sexual Activity    Alcohol use: Yes    Drug use: Never    Sexual activity: Yes     Partners: Female     Birth control/protection: Condom       Review of Systems:  Skin:        Eyes:       ENT:       Respiratory:       Cardiovascular:       Gastroenterology:      Genitourinary:       Musculoskeletal:       Neurologic:       Psychiatric:       Heme/Lymph/Imm:       Endocrine:           Recent Lab Results:  LIPID RESULTS:  Lab Results   Component Value Date    CHOL 161 05/23/2023    HDL 38 (L) 05/23/2023    LDL 86 05/23/2023    TRIG 183 (H) 05/23/2023       LIVER ENZYME RESULTS:  No results found for: AST, ALT    CBC RESULTS:  Lab Results   Component Value Date    WBC 8.6 03/07/2020    RBC 5.08 03/07/2020    HGB 16.1 03/07/2020    HCT 46.7 03/07/2020    MCV 92 03/07/2020    MCH 31.7 03/07/2020    MCHC 34.5 03/07/2020    RDW 13.8 03/07/2020     03/07/2020       BMP RESULTS:  Lab Results   Component Value Date     05/23/2023    POTASSIUM 4.1 05/23/2023    CHLORIDE 99 05/23/2023    CO2 25 05/23/2023    ANIONGAP 13 05/23/2023     (H) 05/23/2023    BUN 7.8 05/23/2023    CR 0.90 05/23/2023    GFRESTIMATED >90 05/23/2023    BRIDGETTE 9.7 05/23/2023        A1C RESULTS:  Lab Results   Component Value Date    A1C 9.4 (H) 05/23/2023       INR RESULTS:  No results found for: INR      ECHOCARDIOGRAM  No results found for this or any previous visit (from the past 8760 hour(s)).      No orders of the defined types were placed in this encounter.    No orders of the defined types were placed in this encounter.    There are no discontinued medications.      Encounter Diagnosis   Name Primary?    Atrial fibrillation by electrocardiogram (H)          CC  Davon Mckinnon MD  [address] [place] [address]      Thank you for  allowing me to participate in the care of your patient.      Sincerely,     Jaime Otero MD     [facility]

## 2023-06-30 ENCOUNTER — TELEPHONE (OUTPATIENT)
Dept: CARDIOLOGY | Facility: CLINIC | Age: 58
End: 2023-06-30
Payer: COMMERCIAL

## 2023-06-30 NOTE — TELEPHONE ENCOUNTER
Pt called stating he has a Ziopatch placed yesterday but it was warm and the glue melted and it fell off. Wondering what he should do.   Instructed pt call [facility] where device was placed. Phone number provided [phone]/ [phone].  Debbie WYLIE

## 2023-07-15 ENCOUNTER — HEALTH MAINTENANCE LETTER (OUTPATIENT)
Age: 58
End: 2023-07-15

## 2023-07-20 ENCOUNTER — HOSPITAL ENCOUNTER (OUTPATIENT)
Dept: CARDIOLOGY | Facility: CLINIC | Age: 58
Discharge: HOME | End: 2023-07-20
Attending: INTERNAL MEDICINE | Admitting: INTERNAL MEDICINE
Payer: COMMERCIAL

## 2023-07-20 DIAGNOSIS — I48.91 ATRIAL FIBRILLATION BY ELECTROCARDIOGRAM (H): ICD-10-CM

## 2023-07-20 LAB — LVEF ECHO: NORMAL

## 2023-07-20 PROCEDURE — 93306 TTE W/DOPPLER COMPLETE: CPT | Mod: 26 | Performed by: INTERNAL MEDICINE

## 2023-07-20 PROCEDURE — 93306 TTE W/DOPPLER COMPLETE: CPT

## 2023-08-07 ENCOUNTER — OFFICE VISIT (OUTPATIENT)
Dept: CARDIOLOGY | Facility: CLINIC | Age: 58
End: 2023-08-07
Attending: INTERNAL MEDICINE
Payer: COMMERCIAL

## 2023-08-07 VITALS
WEIGHT: 315 LBS | HEART RATE: 51 BPM | DIASTOLIC BLOOD PRESSURE: 80 MMHG | HEIGHT: 73 IN | BODY MASS INDEX: 41.75 KG/M2 | OXYGEN SATURATION: 95 % | SYSTOLIC BLOOD PRESSURE: 118 MMHG

## 2023-08-07 DIAGNOSIS — I48.91 ATRIAL FIBRILLATION BY ELECTROCARDIOGRAM (H): ICD-10-CM

## 2023-08-07 DIAGNOSIS — I48.21 PERMANENT ATRIAL FIBRILLATION (H): Primary | ICD-10-CM

## 2023-08-07 PROCEDURE — 99214 OFFICE O/P EST MOD 30 MIN: CPT | Performed by: PHYSICIAN ASSISTANT

## 2023-08-07 NOTE — PROGRESS NOTES
"  Electrophysiology Clinic Progress Note    Abbe Fuentes MRN# [id number]   YOB: 1965 Age: 57 year old     Primary cardiology team: Dr. Otero         Assessment and Plan     In summary, Abbe Fuentes presents today for reassessment of permanent AF, rate controlled on metoprolol, anticoagulated with Eliquis. We reviewed his recent zio monitor and echocardiogram which are reassuring.     Plan:  CBC when able (after starting Eliquis).   Continue current meds.   Discussed signs/sxs of chronotropic incompetence, when to notify clinic.     Follow-up:  Return to see me in six months, and EP MD in 1 year, with a CBC/BMP prior.     Selene Kuhn PA-C  [facility]         History of Presenting Illness     Abbe Fuentes is a pleasant 57 year old patient with a pertinent history of HTN, HLP, DMII, obesity, SUZETTE (intermittent CPAP use), RBBB and persistent AF for which he was referred to meet my colleague Dr. Otero in July 2023. Patient had no sxs in AF but did note that he hadn't been able to detect an accurate HR on his monitor for over a year, indicating long-term persistent AF. A rate control strategy was pursued. Aspirin was stopped in favor of Eliquis for a [id number] score of 2. Metoprolol was continued. Zio monitor after that showed average HR 62 bpm () and a 9% PVC burden. Echocardiogram showed EF 55-60%, normal RV size/fn, mild LAE.     Today, Abbe returns to clinic stating he feels great from a cardiac standpoint. Patient denies chest pain, shortness of breath, PND, orthopnea, edema, claudication, palpitations, near syncope or syncope. No issues with bruising/bleeding. Works as a , sedentary.          Review of Systems     12-pt ROS is negative except for as noted in the HPI.          Physical Exam     Vitals: /80 (BP Location: Left arm, Patient Position: Sitting)   Pulse 51   Ht 1.854 m (6' 1\")   Wt 144.7 kg (319 lb 1.6 oz)   SpO2 " 95%   BMI 42.10 kg/m    Wt Readings from Last 10 Encounters:   08/07/23 144.7 kg (319 lb 1.6 oz)   06/28/23 143.8 kg (317 lb)   05/23/23 144.9 kg (319 lb 8 oz)   05/08/23 138.3 kg (305 lb)   03/07/20 138.4 kg (305 lb 3.2 oz)       Constitutional:  Patient is pleasant, alert, cooperative, and in NAD.  HEENT:  NCAT. PERRLA. EOM's intact.   Neck:  CVP appears normal. No carotid bruits.   Pulmonary: Normal respiratory effort. CTAB.   Cardiac: Irregularly irregular rhythm, variable S1, no murmurs.   Abdomen:  Non-tender abdomen, no hepatosplenomegaly appreciated.   Vascular: Pulses in the upper and lower extremities are 2+ and equal bilaterally.  Extremities: No edema, erythema, cyanosis or tenderness appreciated.  Skin:  No rashes or lesions appreciated.   Neurological:  No gross motor or sensory deficits.   Psych: Appropriate affect.          Data   Labs reviewed:  Recent Labs   Lab Test 06/28/23  1417 05/23/23  1437   LDL  --  86   HDL  --  38*   NHDL  --  123   CHOL  --  161   TRIG  --  183*   TSH 1.49  --        Lab Results   Component Value Date    WBC 8.6 03/07/2020    RBC 5.08 03/07/2020    HGB 16.1 03/07/2020    HCT 46.7 03/07/2020    MCV 92 03/07/2020    MCH 31.7 03/07/2020    MCHC 34.5 03/07/2020    RDW 13.8 03/07/2020     03/07/2020       Lab Results   Component Value Date     05/23/2023    POTASSIUM 4.1 05/23/2023    CHLORIDE 99 05/23/2023    CO2 25 05/23/2023    ANIONGAP 13 05/23/2023     (H) 05/23/2023    BUN 7.8 05/23/2023    CR 0.90 05/23/2023    GFRESTIMATED >90 05/23/2023    BRIDGETTE 9.7 05/23/2023      No results found for: AST, ALT    Lab Results   Component Value Date    A1C 9.4 (H) 05/23/2023       No results found for: INR         Problem List     Patient Active Problem List   Diagnosis    Dyslipidemia    Essential hypertension    SUZETTE (obstructive sleep apnea)    Seasonal allergies    Type 2 diabetes mellitus with hyperglycemia, with long-term current use of insulin (H)    Morbid  obesity (H)            Medications     Current Outpatient Medications   Medication Sig Dispense Refill    apixaban ANTICOAGULANT (ELIQUIS) 5 MG tablet Take 1 tablet (5 mg) by mouth 2 times daily 60 tablet 3    atorvastatin (LIPITOR) 10 MG tablet Take 1 tablet (10 mg) by mouth every morning 90 tablet 4    CETIRIZINE HCL PO       hydrochlorothiazide (HYDRODIURIL) 25 MG tablet Take 1 tablet (25 mg) by mouth daily 90 tablet 4    lisinopril (ZESTRIL) 20 MG tablet Take 1 tablet (20 mg) by mouth daily 90 tablet 3    metoprolol succinate ER (TOPROL XL) 50 MG 24 hr tablet Take 1 tablet (50 mg) by mouth daily 90 tablet 4    Multiple Vitamins-Minerals (MULTIVITAMIN ADULTS PO) Take 1 tablet by mouth      omeprazole (PRILOSEC) 20 MG DR capsule Take 1 capsule by mouth      albuterol (PROVENTIL HFA) 108 (90 Base) MCG/ACT inhaler Inhale 2 puffs into the lungs every 6 hours 8.5 g 0            Past Medical History   History reviewed. No pertinent past medical history.  History reviewed. No pertinent surgical history.  Family History   Problem Relation Age of Onset    No Known Problems Mother     Lung Cancer Father     No Known Problems Sister      Social History     Socioeconomic History    Marital status:      Spouse name: Not on file    Number of children: Not on file    Years of education: Not on file    Highest education level: Not on file   Occupational History    Not on file   Tobacco Use    Smoking status: Former     Packs/day: 0.50     Types: Cigarettes     Quit date:      Years since quittin.6    Smokeless tobacco: Never   Substance and Sexual Activity    Alcohol use: Yes     Comment: occasionally    Drug use: Yes     Types: Marijuana     Comment: rarely    Sexual activity: Yes     Partners: Female     Birth control/protection: Condom   Other Topics Concern    Not on file   Social History Narrative    Not on file     Social Determinants of Health     Financial Resource Strain: Not on file   Food Insecurity:  Not on file   Transportation Needs: Not on file   Physical Activity: Not on file   Stress: Not on file   Social Connections: Not on file   Intimate Partner Violence: Not on file   Housing Stability: Not on file            Allergies   Patient has no known allergies.    Today's clinic visit entailed:  Review of the result(s) of each unique test - EKG, echocardiogram, zio monitor, BMP, CBC  I spent a total of 30 minutes on the day of the visit.   Time spent by me doing chart review, history and exam, documentation and further activities per the note  Provider  Link to MDM Help Grid     The level of medical decision making during this visit was of moderate complexity.

## 2023-08-07 NOTE — PATIENT INSTRUCTIONS
Today's Plan:   Blood count when able (lab).   Return to see me in about six months.     If you have questions or concerns please call my nurse team at [phone].   Scheduling phone number: [phone]  For after hours urgent concerns call [phone] option 2.   Reminder: Please bring in all current medications, over the counter supplements and vitamin bottles to your next appointment.    It was a pleasure seeing you today!     Selene Kuhn PA-C

## 2023-08-07 NOTE — LETTER
8/7/2023    Physician No Ref-Primary  No address on file    RE: Abbe Fuentes       Dear Colleague,     I had the pleasure of seeing Abbe Fuentes in the [facility].    Electrophysiology Clinic Progress Note    Abbe Fuentes MRN# [id number]   YOB: 1965 Age: 57 year old     Primary cardiology team: Dr. Otero         Assessment and Plan     In summary, Abbe Fuetnes presents today for reassessment of permanent AF, rate controlled on metoprolol, anticoagulated with Eliquis. We reviewed his recent zio monitor and echocardiogram which are reassuring.     Plan:  CBC when able (after starting Eliquis).   Continue current meds.   Discussed signs/sxs of chronotropic incompetence, when to notify clinic.     Follow-up:  Return to see me in six months, and EP MD in 1 year, with a CBC/BMP prior.     ARLETH Steele [facility] - [facility]         History of Presenting Illness     Abbe Fuentes is a pleasant 57 year old patient with a pertinent history of HTN, HLP, DMII, obesity, SUZETTE (intermittent CPAP use), RBBB and persistent AF for which he was referred to meet my colleague Dr. Otero in July 2023. Patient had no sxs in AF but did note that he hadn't been able to detect an accurate HR on his monitor for over a year, indicating long-term persistent AF. A rate control strategy was pursued. Aspirin was stopped in favor of Eliquis for a [id number] score of 2. Metoprolol was continued. Zio monitor after that showed average HR 62 bpm () and a 9% PVC burden. Echocardiogram showed EF 55-60%, normal RV size/fn, mild LAE.     Today, Abbe returns to clinic stating he feels great from a cardiac standpoint. Patient denies chest pain, shortness of breath, PND, orthopnea, edema, claudication, palpitations, near syncope or syncope. No issues with bruising/bleeding. Works as a , sedentary.          Review of Systems     12-pt ROS is negative except  "for as noted in the HPI.          Physical Exam     Vitals: /80 (BP Location: Left arm, Patient Position: Sitting)   Pulse 51   Ht 1.854 m (6' 1\")   Wt 144.7 kg (319 lb 1.6 oz)   SpO2 95%   BMI 42.10 kg/m    Wt Readings from Last 10 Encounters:   08/07/23 144.7 kg (319 lb 1.6 oz)   06/28/23 143.8 kg (317 lb)   05/23/23 144.9 kg (319 lb 8 oz)   05/08/23 138.3 kg (305 lb)   03/07/20 138.4 kg (305 lb 3.2 oz)       Constitutional:  Patient is pleasant, alert, cooperative, and in NAD.  HEENT:  NCAT. PERRLA. EOM's intact.   Neck:  CVP appears normal. No carotid bruits.   Pulmonary: Normal respiratory effort. CTAB.   Cardiac: Irregularly irregular rhythm, variable S1, no murmurs.   Abdomen:  Non-tender abdomen, no hepatosplenomegaly appreciated.   Vascular: Pulses in the upper and lower extremities are 2+ and equal bilaterally.  Extremities: No edema, erythema, cyanosis or tenderness appreciated.  Skin:  No rashes or lesions appreciated.   Neurological:  No gross motor or sensory deficits.   Psych: Appropriate affect.          Data   Labs reviewed:  Recent Labs   Lab Test 06/28/23  1417 05/23/23  1437   LDL  --  86   HDL  --  38*   NHDL  --  123   CHOL  --  161   TRIG  --  183*   TSH 1.49  --        Lab Results   Component Value Date    WBC 8.6 03/07/2020    RBC 5.08 03/07/2020    HGB 16.1 03/07/2020    HCT 46.7 03/07/2020    MCV 92 03/07/2020    MCH 31.7 03/07/2020    MCHC 34.5 03/07/2020    RDW 13.8 03/07/2020     03/07/2020       Lab Results   Component Value Date     05/23/2023    POTASSIUM 4.1 05/23/2023    CHLORIDE 99 05/23/2023    CO2 25 05/23/2023    ANIONGAP 13 05/23/2023     (H) 05/23/2023    BUN 7.8 05/23/2023    CR 0.90 05/23/2023    GFRESTIMATED >90 05/23/2023    BRIDGETTE 9.7 05/23/2023      No results found for: AST, ALT    Lab Results   Component Value Date    A1C 9.4 (H) 05/23/2023       No results found for: INR         Problem List     Patient Active Problem List   Diagnosis    " Dyslipidemia    Essential hypertension    SUZETTE (obstructive sleep apnea)    Seasonal allergies    Type 2 diabetes mellitus with hyperglycemia, with long-term current use of insulin (H)    Morbid obesity (H)            Medications     Current Outpatient Medications   Medication Sig Dispense Refill    apixaban ANTICOAGULANT (ELIQUIS) 5 MG tablet Take 1 tablet (5 mg) by mouth 2 times daily 60 tablet 3    atorvastatin (LIPITOR) 10 MG tablet Take 1 tablet (10 mg) by mouth every morning 90 tablet 4    CETIRIZINE HCL PO       hydrochlorothiazide (HYDRODIURIL) 25 MG tablet Take 1 tablet (25 mg) by mouth daily 90 tablet 4    lisinopril (ZESTRIL) 20 MG tablet Take 1 tablet (20 mg) by mouth daily 90 tablet 3    metoprolol succinate ER (TOPROL XL) 50 MG 24 hr tablet Take 1 tablet (50 mg) by mouth daily 90 tablet 4    Multiple Vitamins-Minerals (MULTIVITAMIN ADULTS PO) Take 1 tablet by mouth      omeprazole (PRILOSEC) 20 MG DR capsule Take 1 capsule by mouth      albuterol (PROVENTIL HFA) 108 (90 Base) MCG/ACT inhaler Inhale 2 puffs into the lungs every 6 hours 8.5 g 0            Past Medical History   History reviewed. No pertinent past medical history.  History reviewed. No pertinent surgical history.  Family History   Problem Relation Age of Onset    No Known Problems Mother     Lung Cancer Father     No Known Problems Sister      Social History     Socioeconomic History    Marital status:      Spouse name: Not on file    Number of children: Not on file    Years of education: Not on file    Highest education level: Not on file   Occupational History    Not on file   Tobacco Use    Smoking status: Former     Packs/day: 0.50     Types: Cigarettes     Quit date:      Years since quittin.6    Smokeless tobacco: Never   Substance and Sexual Activity    Alcohol use: Yes     Comment: occasionally    Drug use: Yes     Types: Marijuana     Comment: rarely    Sexual activity: Yes     Partners: Female     Birth  control/protection: Condom   Other Topics Concern    Not on file   Social History Narrative    Not on file     Social Determinants of Health     Financial Resource Strain: Not on file   Food Insecurity: Not on file   Transportation Needs: Not on file   Physical Activity: Not on file   Stress: Not on file   Social Connections: Not on file   Intimate Partner Violence: Not on file   Housing Stability: Not on file            Allergies   Patient has no known allergies.    Today's clinic visit entailed:  Review of the result(s) of each unique test - EKG, echocardiogram, zio monitor, BMP, CBC  I spent a total of 30 minutes on the day of the visit.   Time spent by me doing chart review, history and exam, documentation and further activities per the note  Provider  Link to [facility] Help Grid     The level of medical decision making during this visit was of moderate complexity.      Thank you for allowing me to participate in the care of your patient.      Sincerely,     Selene Kuhn PA-C     [facility]  cc:   Jaime Otero MD  [address]  [place] [id number]

## 2023-09-23 ENCOUNTER — HEALTH MAINTENANCE LETTER (OUTPATIENT)
Age: 58
End: 2023-09-23

## 2023-10-11 ENCOUNTER — VIRTUAL VISIT (OUTPATIENT)
Dept: UROLOGY | Facility: CLINIC | Age: 58
End: 2023-10-11
Attending: INTERNAL MEDICINE
Payer: COMMERCIAL

## 2023-10-11 VITALS — WEIGHT: 306 LBS | BODY MASS INDEX: 40.37 KG/M2

## 2023-10-11 DIAGNOSIS — Z30.2 ENCOUNTER FOR VASECTOMY: ICD-10-CM

## 2023-10-11 PROCEDURE — 99203 OFFICE O/P NEW LOW 30 MIN: CPT | Mod: VID | Performed by: UROLOGY

## 2023-10-11 ASSESSMENT — PAIN SCALES - GENERAL: PAINLEVEL: NO PAIN (0)

## 2023-10-11 NOTE — PROGRESS NOTES
VASECTOMY CONSULTATION NOTE  DATE OF VISIT: 10/11/2023  [facility]   PATIENT NAME: Abbe Fuentes    YOB: 1965      REASON FOR CONSULTATION: Mr. Abbe Fuentes is a 57 year old year old gentleman who is seen today requesting a vasectomy. He has 1 adult child - and he wishes to have a vasectomy for birth control.     He is .    He takes Eliquis for A-fib for the past several months    PAST MEDICAL HISTORY: No past medical history on file.    PAST SURGICAL HISTORY: No past surgical history on file.    MEDICATIONS:   Current Outpatient Medications:     apixaban ANTICOAGULANT (ELIQUIS) 5 MG tablet, Take 1 tablet (5 mg) by mouth 2 times daily, Disp: 180 tablet, Rfl: 3    atorvastatin (LIPITOR) 10 MG tablet, Take 1 tablet (10 mg) by mouth every morning, Disp: 90 tablet, Rfl: 4    CETIRIZINE HCL PO, , Disp: , Rfl:     hydrochlorothiazide (HYDRODIURIL) 25 MG tablet, Take 1 tablet (25 mg) by mouth daily, Disp: 90 tablet, Rfl: 4    lisinopril (ZESTRIL) 20 MG tablet, Take 1 tablet (20 mg) by mouth daily, Disp: 90 tablet, Rfl: 3    metoprolol succinate ER (TOPROL XL) 50 MG 24 hr tablet, Take 1 tablet (50 mg) by mouth daily, Disp: 90 tablet, Rfl: 4    Multiple Vitamins-Minerals (MULTIVITAMIN ADULTS PO), Take 1 tablet by mouth, Disp: , Rfl:     omeprazole (PRILOSEC) 20 MG DR capsule, Take 1 capsule by mouth, Disp: , Rfl:     ALLERGIES: No Known Allergies    FAMILY HISTORY:   Family History   Problem Relation Age of Onset    No Known Problems Mother     Lung Cancer Father     No Known Problems Sister        SOCIAL HISTORY:   Social History     Socioeconomic History    Marital status:      Spouse name: Not on file    Number of children: Not on file    Years of education: Not on file    Highest education level: Not on file   Occupational History    Not on file   Tobacco Use    Smoking status: Former     Packs/day: .5     Types: Cigarettes     Quit date:      Years since quittin.7     Smokeless tobacco: Never   Substance and Sexual Activity    Alcohol use: Yes     Comment: occasionally    Drug use: Yes     Types: Marijuana     Comment: rarely    Sexual activity: Yes     Partners: Female     Birth control/protection: Condom   Other Topics Concern    Not on file   Social History Narrative    Not on file     Social Determinants of Health     Financial Resource Strain: Not on file   Food Insecurity: Not on file   Transportation Needs: Not on file   Physical Activity: Not on file   Stress: Not on file   Social Connections: Not on file   Interpersonal Safety: Not on file   Housing Stability: Not on file       PHYSICAL EXAM  Patient is a 57 year old  male   Vitals: Weight 138.8 kg (306 lb).  Body mass index is 40.37 kg/m .  General Appearance Adult:   Alert, no acute distress, oriented  HENT: throat/mouth:normal, good dentition  Lungs: no respiratory distress, or pursed lip breathing  Heart: No obvious jugular venous distension present  Abdomen: obesely - distended  Musculoskeltal: extremities normal, no peripheral edema  Skin: no suspicious lesions or rashes  Neuro: Alert, oriented, speech and mentation normal  Psych: affect and mood normal    DIAGNOSIS: Request for sterilization    PLAN: The risks of the procedure as well as expectations for recovery and outcomes were explained in detail to him.  He was counseled on the risks for bleeding infection and pain after the procedure. We discussed the risk of post-vasectomy pain syndrome.  He was instructed to continue to use contraception until he had proven azoospermia on a semen specimen.  This would normally be collected at least 3 months after the procedure. Also discussed the rare, but possible risk of re-canalization of the vas, even after successful vasectomy with sterile semen specimen.  He was instructed to hold all anticoagulants medications for one week prior to the procedure.  It was recommended that he have someone else drive him home after his  vasectomy.  In light of these risks and expectations he would like to proceed.  We are scheduling a vasectomy in the office in the near future.    Pt. Understands:  -1/1000-1/3000 risk of future pregnancy even with perfectly done vasectomy  -vasectomy is a permanent procedure    -he may cryopreserve sperm if he wishes   -1-5% risk of post-vasectomy pain syndrome   -1-5% risk of complication, primarily infection or bleeding  - he needs to have a semen sample that shows no sperm before getting approval for unprotected intercourse.      We discussed the impacted of obesity  We discussed the need to be off the Eliquis     Thank you for the kind consultation.    Time spent: 15 minutes spent on the date of the encounter doing chart review, history and exam, documentation and further activities as noted above.     Davon Flores MD   Urology  [facility]  Clinic Phone [phone]        Virtual Visit Details    Type of service:  Video Visit     Originating Location (pt. Location): Home    Distant Location (provider location):  On-site  Platform used for Video Visit: María

## 2023-10-11 NOTE — LETTER
10/11/2023       RE: Abbe Fuentes  [id number] [facility] [address]     Dear Colleague,    Thank you for referring your patient, Abbe Fuentes, to the [facility] at [facility]. Please see a copy of my visit note below.    VASECTOMY CONSULTATION NOTE  DATE OF VISIT: 10/11/2023  [facility]   PATIENT NAME: Abbe Fuentes    YOB: 1965      REASON FOR CONSULTATION: Mr. Abbe Fuentes is a 57 year old year old gentleman who is seen today requesting a vasectomy. He has 1 adult child - and he wishes to have a vasectomy for birth control.     He is .    He takes Eliquis for A-fib for the past several months    PAST MEDICAL HISTORY: No past medical history on file.    PAST SURGICAL HISTORY: No past surgical history on file.    MEDICATIONS:   Current Outpatient Medications:     apixaban ANTICOAGULANT (ELIQUIS) 5 MG tablet, Take 1 tablet (5 mg) by mouth 2 times daily, Disp: 180 tablet, Rfl: 3    atorvastatin (LIPITOR) 10 MG tablet, Take 1 tablet (10 mg) by mouth every morning, Disp: 90 tablet, Rfl: 4    CETIRIZINE HCL PO, , Disp: , Rfl:     hydrochlorothiazide (HYDRODIURIL) 25 MG tablet, Take 1 tablet (25 mg) by mouth daily, Disp: 90 tablet, Rfl: 4    lisinopril (ZESTRIL) 20 MG tablet, Take 1 tablet (20 mg) by mouth daily, Disp: 90 tablet, Rfl: 3    metoprolol succinate ER (TOPROL XL) 50 MG 24 hr tablet, Take 1 tablet (50 mg) by mouth daily, Disp: 90 tablet, Rfl: 4    Multiple Vitamins-Minerals (MULTIVITAMIN ADULTS PO), Take 1 tablet by mouth, Disp: , Rfl:     omeprazole (PRILOSEC) 20 MG DR capsule, Take 1 capsule by mouth, Disp: , Rfl:     ALLERGIES: No Known Allergies    FAMILY HISTORY:   Family History   Problem Relation Age of Onset    No Known Problems Mother     Lung Cancer Father     No Known Problems Sister        SOCIAL HISTORY:   Social History     Socioeconomic History    Marital status:       Spouse name: Not on file    Number of children: Not on file    Years of education: Not on file    Highest education level: Not on file   Occupational History    Not on file   Tobacco Use    Smoking status: Former     Packs/day: .5     Types: Cigarettes     Quit date:      Years since quittin.7    Smokeless tobacco: Never   Substance and Sexual Activity    Alcohol use: Yes     Comment: occasionally    Drug use: Yes     Types: Marijuana     Comment: rarely    Sexual activity: Yes     Partners: Female     Birth control/protection: Condom   Other Topics Concern    Not on file   Social History Narrative    Not on file     Social Determinants of Health     Financial Resource Strain: Not on file   Food Insecurity: Not on file   Transportation Needs: Not on file   Physical Activity: Not on file   Stress: Not on file   Social Connections: Not on file   Interpersonal Safety: Not on file   Housing Stability: Not on file       PHYSICAL EXAM  Patient is a 57 year old  male   Vitals: Weight 138.8 kg (306 lb).  Body mass index is 40.37 kg/m .  General Appearance Adult:   Alert, no acute distress, oriented  HENT: throat/mouth:normal, good dentition  Lungs: no respiratory distress, or pursed lip breathing  Heart: No obvious jugular venous distension present  Abdomen: obesely - distended  Musculoskeltal: extremities normal, no peripheral edema  Skin: no suspicious lesions or rashes  Neuro: Alert, oriented, speech and mentation normal  Psych: affect and mood normal    DIAGNOSIS: Request for sterilization    PLAN: The risks of the procedure as well as expectations for recovery and outcomes were explained in detail to him.  He was counseled on the risks for bleeding infection and pain after the procedure. We discussed the risk of post-vasectomy pain syndrome.  He was instructed to continue to use contraception until he had proven azoospermia on a semen specimen.  This would normally be collected at least 3 months after the  procedure. Also discussed the rare, but possible risk of re-canalization of the vas, even after successful vasectomy with sterile semen specimen.  He was instructed to hold all anticoagulants medications for one week prior to the procedure.  It was recommended that he have someone else drive him home after his vasectomy.  In light of these risks and expectations he would like to proceed.  We are scheduling a vasectomy in the office in the near future.    Pt. Understands:  -1/1000-1/3000 risk of future pregnancy even with perfectly done vasectomy  -vasectomy is a permanent procedure    -he may cryopreserve sperm if he wishes   -1-5% risk of post-vasectomy pain syndrome   -1-5% risk of complication, primarily infection or bleeding  - he needs to have a semen sample that shows no sperm before getting approval for unprotected intercourse.      We discussed the impacted of obesity  We discussed the need to be off the Eliquis     Thank you for the kind consultation.    Time spent: 15 minutes spent on the date of the encounter doing chart review, history and exam, documentation and further activities as noted above.     Davon Flores MD   Urology  [facility]  Clinic Phone [phone]        Virtual Visit Details    Type of service:  Video Visit     Originating Location (pt. Location): Home    Distant Location (provider location):  On-site  Platform used for Video Visit: María

## 2023-10-11 NOTE — NURSING NOTE
Is the patient currently in the state of [place]? YES    Visit mode:VIDEO    If the visit is dropped, the patient can be reconnected by: VIDEO VISIT: Text to cell phone:   Telephone Information:   Mobile [phone]       Will anyone else be joining the visit? NO  (If patient encounters technical issues they should call [phone]    How would you like to obtain your AVS? MyChart    Are changes needed to the allergy or medication list? No, Pt stated no changes to allergies, and Pt stated no med changes    Reason for visit: INGRID GALICIA

## 2023-10-12 ENCOUNTER — TELEPHONE (OUTPATIENT)
Dept: UROLOGY | Facility: CLINIC | Age: 58
End: 2023-10-12
Payer: COMMERCIAL

## 2023-10-12 NOTE — TELEPHONE ENCOUNTER
----- Message from Janessa Ortega sent at 10/12/2023  8:12 AM CDT -----  Regarding: Vasectomy  Please contact to schedule a vasectomy - he will reach out to his cardiologist regarding his Eliquis    [facility]  10/11/23

## 2023-10-19 ENCOUNTER — OFFICE VISIT (OUTPATIENT)
Dept: URGENT CARE | Facility: URGENT CARE | Age: 58
End: 2023-10-19
Payer: COMMERCIAL

## 2023-10-19 VITALS
TEMPERATURE: 99.1 F | HEART RATE: 90 BPM | DIASTOLIC BLOOD PRESSURE: 81 MMHG | WEIGHT: 310 LBS | SYSTOLIC BLOOD PRESSURE: 121 MMHG | OXYGEN SATURATION: 95 % | BODY MASS INDEX: 40.9 KG/M2

## 2023-10-19 DIAGNOSIS — J06.9 UPPER RESPIRATORY TRACT INFECTION, UNSPECIFIED TYPE: Primary | ICD-10-CM

## 2023-10-19 DIAGNOSIS — I48.91 ATRIAL FIBRILLATION BY ELECTROCARDIOGRAM (H): ICD-10-CM

## 2023-10-19 DIAGNOSIS — I48.21 PERMANENT ATRIAL FIBRILLATION (H): ICD-10-CM

## 2023-10-19 DIAGNOSIS — R07.0 THROAT PAIN: ICD-10-CM

## 2023-10-19 DIAGNOSIS — Z20.822 SUSPECTED 2019 NOVEL CORONAVIRUS INFECTION: ICD-10-CM

## 2023-10-19 LAB
DEPRECATED S PYO AG THROAT QL EIA: NEGATIVE
ERYTHROCYTE [DISTWIDTH] IN BLOOD BY AUTOMATED COUNT: 12.8 % (ref 10–15)
GROUP A STREP BY PCR: NOT DETECTED
HCT VFR BLD AUTO: 51 % (ref 40–53)
HGB BLD-MCNC: 17.1 G/DL (ref 13.3–17.7)
MCH RBC QN AUTO: 31.1 PG (ref 26.5–33)
MCHC RBC AUTO-ENTMCNC: 33.5 G/DL (ref 31.5–36.5)
MCV RBC AUTO: 93 FL (ref 78–100)
PLATELET # BLD AUTO: 173 10E3/UL (ref 150–450)
RBC # BLD AUTO: 5.49 10E6/UL (ref 4.4–5.9)
SARS-COV-2 RNA RESP QL NAA+PROBE: NEGATIVE
TSH SERPL DL<=0.005 MIU/L-ACNC: 1.03 UIU/ML (ref 0.3–4.2)
WBC # BLD AUTO: 6.2 10E3/UL (ref 4–11)

## 2023-10-19 PROCEDURE — 84443 ASSAY THYROID STIM HORMONE: CPT | Performed by: PHYSICIAN ASSISTANT

## 2023-10-19 PROCEDURE — 87651 STREP A DNA AMP PROBE: CPT | Performed by: PHYSICIAN ASSISTANT

## 2023-10-19 PROCEDURE — 87635 SARS-COV-2 COVID-19 AMP PRB: CPT | Performed by: PHYSICIAN ASSISTANT

## 2023-10-19 PROCEDURE — 85027 COMPLETE CBC AUTOMATED: CPT | Performed by: PHYSICIAN ASSISTANT

## 2023-10-19 PROCEDURE — 99203 OFFICE O/P NEW LOW 30 MIN: CPT | Performed by: PHYSICIAN ASSISTANT

## 2023-10-19 PROCEDURE — 36415 COLL VENOUS BLD VENIPUNCTURE: CPT | Performed by: PHYSICIAN ASSISTANT

## 2023-10-19 ASSESSMENT — ENCOUNTER SYMPTOMS
WHEEZING: 0
SINUS PAIN: 0
FEVER: 0
CHEST TIGHTNESS: 0
SHORTNESS OF BREATH: 0
SINUS PRESSURE: 0
RHINORRHEA: 1
CHILLS: 0
COUGH: 1

## 2023-10-19 NOTE — PROGRESS NOTES
Assessment & Plan     Upper respiratory tract infection, unspecified type    -clinical presentation of a viral infection. Patient advised on supportive or symptomatic treatment.    Throat pain    -Strep (-)  - Streptococcus A Rapid Screen w/Reflex to PCR - Clinic Collect  - Group A Streptococcus PCR Throat Swab    Suspected 2019 novel coronavirus infection    -Pending COVID-19  - Symptomatic COVID-19 Virus (Coronavirus) by PCR Nose     Results for orders placed or performed in visit on 10/19/23   Streptococcus A Rapid Screen w/Reflex to PCR - Clinic Collect     Status: Normal    Specimen: Throat; Swab   Result Value Ref Range    Group A Strep antigen Negative Negative       Patient Instructions   Increase fluids with water, Gatorade, or rehydrating beverages. Alternate acetaminophen and Ibuprofen as needed for aches, pains or fever. Follow clear liquid to BRAT diet (bananas, rice, applesauce, toast) as needed for any upset stomach.   For cough I suggest using over the counter medications such as dextromethorphan or guaifenesin.  Follow up in clinic if symptoms persist or worsen. This usually can last 10-14 days.       Return if symptoms worsen or fail to improve, for Follow up.    At the end of the encounter, I discussed results, diagnosis, medications. Discussed red flags for immediate return to clinic/ER, as well as indications for follow up if no improvement. Patient understood and agreed to plan. Patient was stable for discharge.    Shivam Mcadams is a 57 year old male who presents to clinic today for the following health issues:  Chief Complaint   Patient presents with    Urgent Care     Pt reports throat pain and congestion X 5 days.     HPI  Patient reports sore throat, congestion, runny nose, cough x 5 days. He has been taking cetirizine and cough syrup which help somewhat. Denies shortness of breath, wheezing, chest pain or tightness.       Review of Systems   Constitutional:  Negative for chills and  fever.   HENT:  Positive for congestion and rhinorrhea. Negative for sinus pressure and sinus pain.    Respiratory:  Positive for cough. Negative for chest tightness, shortness of breath and wheezing.    Cardiovascular:  Negative for chest pain.       Problem List:  2023: SUZETTE (obstructive sleep apnea)  2023: Morbid obesity (H)  2022: Dyslipidemia  2022: Type 2 diabetes mellitus with hyperglycemia, with long-term   current use of insulin (H)  2014: Essential hypertension  2014: Seasonal allergies      No past medical history on file.    Social History     Tobacco Use    Smoking status: Former     Packs/day: .5     Types: Cigarettes     Quit date:      Years since quittin.8    Smokeless tobacco: Never   Substance Use Topics    Alcohol use: Yes     Comment: occasionally           Objective    /81   Pulse 90   Temp 99.1  F (37.3  C) (Tympanic)   Wt 140.6 kg (310 lb)   SpO2 95%   BMI 40.90 kg/m    Physical Exam  Constitutional:       Appearance: Normal appearance.   HENT:      Head: Normocephalic.      Nose: Congestion and rhinorrhea present.      Right Sinus: No maxillary sinus tenderness or frontal sinus tenderness.      Left Sinus: No maxillary sinus tenderness or frontal sinus tenderness.      Mouth/Throat:      Mouth: Mucous membranes are moist.      Pharynx: Oropharynx is clear. Uvula midline. No posterior oropharyngeal erythema.   Cardiovascular:      Rate and Rhythm: Normal rate and regular rhythm.   Pulmonary:      Effort: Pulmonary effort is normal.      Breath sounds: Normal breath sounds.   Lymphadenopathy:      Head:      Right side of head: No submental, submandibular or tonsillar adenopathy.      Left side of head: No submental, submandibular or tonsillar adenopathy.      Cervical: No cervical adenopathy.      Right cervical: No superficial cervical adenopathy.     Left cervical: No superficial cervical adenopathy.   Skin:     General: Skin is warm and dry.       Findings: No rash.   Neurological:      Mental Status: He is alert.   Psychiatric:         Mood and Affect: Mood normal.         Behavior: Behavior normal.              Bronwyn Colindres PA-C

## 2023-10-19 NOTE — PATIENT INSTRUCTIONS
Increase fluids with water, Gatorade, or rehydrating beverages. Alternate acetaminophen and Ibuprofen as needed for aches, pains or fever. Follow clear liquid to BRAT diet (bananas, rice, applesauce, toast) as needed for any upset stomach.   For cough I suggest using over the counter medications such as dextromethorphan or guaifenesin.  Follow up in clinic if symptoms persist or worsen. This usually can last 10-14 days.

## 2023-12-01 ENCOUNTER — OFFICE VISIT (OUTPATIENT)
Dept: UROLOGY | Facility: CLINIC | Age: 58
End: 2023-12-01
Payer: COMMERCIAL

## 2023-12-01 VITALS — DIASTOLIC BLOOD PRESSURE: 94 MMHG | HEART RATE: 91 BPM | SYSTOLIC BLOOD PRESSURE: 148 MMHG | OXYGEN SATURATION: 95 %

## 2023-12-01 DIAGNOSIS — Z30.2 ENCOUNTER FOR STERILIZATION: Primary | ICD-10-CM

## 2023-12-01 PROCEDURE — 88302 TISSUE EXAM BY PATHOLOGIST: CPT | Performed by: PATHOLOGY

## 2023-12-01 PROCEDURE — 55250 REMOVAL OF SPERM DUCT(S): CPT | Performed by: UROLOGY

## 2023-12-01 RX ORDER — LIDOCAINE HYDROCHLORIDE 10 MG/ML
20 INJECTION, SOLUTION INFILTRATION; PERINEURAL ONCE
Status: COMPLETED | OUTPATIENT
Start: 2023-12-01 | End: 2023-12-01

## 2023-12-01 RX ADMIN — LIDOCAINE HYDROCHLORIDE 20 ML: 10 INJECTION, SOLUTION INFILTRATION; PERINEURAL at 09:45

## 2023-12-01 NOTE — PROGRESS NOTES
OFFICE VASECTOMY OPERATIVE NOTE  [facility]     DATE: 12/01/23  PATIENT: Abbe Fuentes    YOB: 1965    Abbe Fuentes is a 57 year old male.  He has 1 child and he wishes a vasectomy for birth control.  He has read the brochure and he has shaved himself.  I reviewed the vasectomy procedure with him explaining that it would be done with a local anesthetic given just in the location where the vasectomy would be done.  It would be done through incisions with the removal of segments of the vasa, cauterization of the ends, and burying the ends separate with sutures.      Pt. Understands:  -1/1000-1/3000 risk of future pregnancy even with perfectly done vasectomy  -vasectomy is a permanent procedure    -he may cryopreserve sperm if he wishes   -1-5% risk of post-vasectomy pain syndrome   -1-5% risk of complication, primarily infection or bleeding  - he needs to have a semen sample that shows no sperm before getting approval for unprotected intercourse.      Complications such as bleeding, infection, and damage to other tissues in the area were discussed. I recommended that an ice bag be placed on the scrotum off and on tonight to help reduce pain and swelling.      He was reminded that he was not sterile immediately after the vasectomy that it would take at least 20 ejaculations to empty the vas of any remaining sperm.  He was not to provide a semen sample until after the 20th ejaculation and not before 12 weeks after the vas. He was  to fulfill both of those requirements.   He understands it is his responsibility to find out the results of the vas before proceeding with intercourse without birth control protection.  Other items discussed were activity afterwards, returning to work, voluntary physical activity,  resuming sexual activity, clothing to wear, bathing, and care of the vas site and expected changes in the site as healing progresses.  After signing the permit, bilateral vasectomy was done  as described below.     ANESTHESIA: Local    DETAILS OF PROCEDURE: The risks of the procedure were explained in detail to the patient and informed consent was obtained. The patient was placed supine on the procedure table and the penis and scrotum were prepped and draped in the standard sterile fashion. The right vas deferens was isolated and brought up to the skin. 1% lidocaine local anesthesia was used to infiltrate the skin and the spermatic cord. A small incision was created and adventitial tissues were swept away from the vas. A 1 cm segment of the vas was excised and sent for pathology. The proximal and distal lumina of the vas were cauterized and then each segment was tied off in a knuckling-fashion with a 3-0 vicryl suture. Hemostasis was ensured and the segments were released back into the scrotum. Meticulous hemostasis was achieved. The skin was closed with 3-0 chromic suture in a horizontal mattress fashion. Next the left vas was brought to the skin and a vasectomy was performed in the similar fashion. The skin was closed with 3-0 chromic suture in a horizontal mattress fashion.    COMPLICATIONS: None    TAKE HOME MEDICATIONS: Tylenol every 6 hours, PRN    DISMISSAL INSTRUCTIONS:  - Ice pack to scrotum 15 to 20 minutes each hour awake for 36 to 40 hours.  - No strenuous activity or ejaculation for at least 7 days.  - No unprotected sexual activity until proven azoospermia on semen samples at 3 months.  - Referred to patient handout for normal postop expectations and indications to contact nurse or physician.    M.D.: Davon Flores MD

## 2023-12-01 NOTE — NURSING NOTE
Chief Complaint   Patient presents with    Sterilization     Patient is here for Vasectomy      Patient has signed the consent form stating that we will be doing a bilateral vasectomy today and that this is the correct procedure.  I verbally confirmed the patient's identity using two indicators, relevant allergies, and that the correct equipment was available. Patient was cleaned and prepped according to the appropriate policy.  Equipment was prepped in a sterile fashion and MD was informed that patient was ready.    Consent read and signed: Yes  Aspirin/blood thinning products stopped 7 days prior to procedure: Yes  No Known Allergies    Physician performed procedure.  After the procedure the patient was instructed to wait approximately three months and at least 30 ejaculations prior to returning a sample to confirm sterility.  The patient was instructed to bring in sample within 3-6 hours post sample ejaculation.  Any additional medications after the procedure were sent to the patient's pharmacy and instructions were given according to company protocol and the performing physician.  The physician performing the procedure prescribed as they felt appropriate.  Patient was also instructed to avoid heavy lifting or strenuous activity for 10-14 days and to ice the scrotum.  Samples from the R and L vas deferens were sent to the lab and an order was placed for future semen analysis.       Deana Gallowya LPN

## 2023-12-01 NOTE — LETTER
12/1/2023       RE: Abbe Fuentes  [address] [facility] [address]     Dear Colleague,    Thank you for referring your patient, Abbe Fuentes, to the [facility] [place] at [facility]. Please see a copy of my visit note below.    OFFICE VASECTOMY OPERATIVE NOTE  [facility]     DATE: 12/01/23  PATIENT: Abbe Fuentes    YOB: 1965    Abbe Fuentes is a 57 year old male.  He has 1 child and he wishes a vasectomy for birth control.  He has read the brochure and he has shaved himself.  I reviewed the vasectomy procedure with him explaining that it would be done with a local anesthetic given just in the location where the vasectomy would be done.  It would be done through incisions with the removal of segments of the vasa, cauterization of the ends, and burying the ends separate with sutures.      Pt. Understands:  -1/1000-1/3000 risk of future pregnancy even with perfectly done vasectomy  -vasectomy is a permanent procedure    -he may cryopreserve sperm if he wishes   -1-5% risk of post-vasectomy pain syndrome   -1-5% risk of complication, primarily infection or bleeding  - he needs to have a semen sample that shows no sperm before getting approval for unprotected intercourse.      Complications such as bleeding, infection, and damage to other tissues in the area were discussed. I recommended that an ice bag be placed on the scrotum off and on tonight to help reduce pain and swelling.      He was reminded that he was not sterile immediately after the vasectomy that it would take at least 20 ejaculations to empty the vas of any remaining sperm.  He was not to provide a semen sample until after the 20th ejaculation and not before 12 weeks after the vas. He was  to fulfill both of those requirements.   He understands it is his responsibility to find out the results of the vas before proceeding with intercourse without  birth control protection.  Other items discussed were activity afterwards, returning to work, voluntary physical activity,  resuming sexual activity, clothing to wear, bathing, and care of the vas site and expected changes in the site as healing progresses.  After signing the permit, bilateral vasectomy was done as described below.     ANESTHESIA: Local    DETAILS OF PROCEDURE: The risks of the procedure were explained in detail to the patient and informed consent was obtained. The patient was placed supine on the procedure table and the penis and scrotum were prepped and draped in the standard sterile fashion. The right vas deferens was isolated and brought up to the skin. 1% lidocaine local anesthesia was used to infiltrate the skin and the spermatic cord. A small incision was created and adventitial tissues were swept away from the vas. A 1 cm segment of the vas was excised and sent for pathology. The proximal and distal lumina of the vas were cauterized and then each segment was tied off in a knuckling-fashion with a 3-0 vicryl suture. Hemostasis was ensured and the segments were released back into the scrotum. Meticulous hemostasis was achieved. The skin was closed with 3-0 chromic suture in a horizontal mattress fashion. Next the left vas was brought to the skin and a vasectomy was performed in the similar fashion. The skin was closed with 3-0 chromic suture in a horizontal mattress fashion.    COMPLICATIONS: None    TAKE HOME MEDICATIONS: Tylenol every 6 hours, PRN    DISMISSAL INSTRUCTIONS:  - Ice pack to scrotum 15 to 20 minutes each hour awake for 36 to 40 hours.  - No strenuous activity or ejaculation for at least 7 days.  - No unprotected sexual activity until proven azoospermia on semen samples at 3 months.  - Referred to patient handout for normal postop expectations and indications to contact nurse or physician.    M.D.: Davon Flores MD

## 2023-12-01 NOTE — PATIENT INSTRUCTIONS
POST VASECTOMY INSTRUCTIONS    1.) If you have any concerns or questions, please contact our office at [phone]     2.) It is okay to take a shower, however, do not soak in water (bath,swimming, hot tub,etc....) until your incision is healed.    3.) You might notice some swelling, mild bruising, and discomfort for several days after your vasectomy. This is to be expected. For at least the next 24 hours, an ice pack should be applied for 20 minutes every hour that you are awake. Ice will help with discomfort and swelling. Do not place directly on the skin. The lidocaine from the procedure will usually wear off in about an hour. Start icing right away to get ahead of any swelling, that may cause pain.     4.) No intercourse, strenuous activity or exercise for at least 7-10 days, even if you feel fine. It take two weeks to fully heal and stitches will dissolve in 7-14 days.     5.) You need to wear good scrotal support while you are healing. We strongly recommend an athletic supporter or a pair of regular briefs that are one size too small. Boxer briefs do not offer enough support.    6.) Tylenol as directed on the bottle is preferred for discomfort. Please avoid any blood thinning products such as ibuprofen and aspirin (Motrin, Advil, Excedrin, Aleve, ect..) for at least the next week. You may two extra strength tylenol 2- 500mg pills every 6 hours. No more than 4,000mg daily.     7.) It is normal to have mild drainage from the incision area for several days. You have been given some antibiotic ointment for this and some gauze to use as needed. However, please contact our office if you notice: bright red blood that does not stop after three days, increased pain, heat at the incision, red streaks, foul smelling discharge, or if you start to run a fever over 101.1.     8.) YOU MUST CONTINUE BIRTH CONTROL UNTIL WE CONFIRM YOUR STERILITY.  This process can take up to a year to complete (rare occurrence).     9.) You  have been given a form with specimen cup and instructions for your follow up specimen. You will be cleared once we receive ONE negative specimen. If your specimen comes back positive (sperm seen) you will be asked to repeat the test. This does not mean that your vasectomy has failed. You should however, continue taking birth control until given the all clear.

## 2023-12-01 NOTE — NURSING NOTE
The following medication was given by MD:     MEDICATION:  Lidocaine 1% Soln  ROUTE: Local Infiltration   SITE: Scrotum   DOSE: 200mg/20ml  LOT #: [id number]  : Hospira  EXPIRATION DATE: 02/01/2025  NDC#: 5654-4823-33  Was there drug waste? Yes  Amount of drug waste (mL): 0.  Reason for waste:  As per MD  Multi-dose vial: Yes     Deana Galloway LPN

## 2023-12-04 LAB
PATH REPORT.COMMENTS IMP SPEC: NORMAL
PATH REPORT.COMMENTS IMP SPEC: NORMAL
PATH REPORT.FINAL DX SPEC: NORMAL
PATH REPORT.GROSS SPEC: NORMAL
PATH REPORT.MICROSCOPIC SPEC OTHER STN: NORMAL
PATH REPORT.RELEVANT HX SPEC: NORMAL
PHOTO IMAGE: NORMAL

## 2023-12-27 ENCOUNTER — VIRTUAL VISIT (OUTPATIENT)
Dept: INTERNAL MEDICINE | Facility: CLINIC | Age: 58
End: 2023-12-27
Payer: COMMERCIAL

## 2023-12-27 DIAGNOSIS — F90.9 ATTENTION DEFICIT HYPERACTIVITY DISORDER (ADHD), UNSPECIFIED ADHD TYPE: Primary | ICD-10-CM

## 2023-12-27 PROBLEM — I48.91 A-FIB (H): Status: ACTIVE | Noted: 2023-12-27

## 2023-12-27 PROCEDURE — 99214 OFFICE O/P EST MOD 30 MIN: CPT | Mod: 95 | Performed by: INTERNAL MEDICINE

## 2023-12-27 RX ORDER — BUPROPION HYDROCHLORIDE 300 MG/1
300 TABLET ORAL EVERY MORNING
Qty: 90 TABLET | Refills: 0 | Status: SHIPPED | OUTPATIENT
Start: 2024-01-07 | End: 2024-03-25

## 2023-12-27 RX ORDER — BUPROPION HYDROCHLORIDE 150 MG/1
150 TABLET ORAL EVERY MORNING
Qty: 10 TABLET | Refills: 0 | Status: SHIPPED | OUTPATIENT
Start: 2023-12-27 | End: 2024-06-13

## 2023-12-27 NOTE — PROGRESS NOTES
Abbe is a 57 year old who is being evaluated via a billable video visit.      How would you like to obtain your AVS? [employer]  If the video visit is dropped, the invitation should be resent by: Send to e-mail at: [email or web address]  Will anyone else be joining your video visit? No    Assessment & Plan   Attention deficit hyperactivity disorder (ADHD), unspecified ADHD type  Discussed risk factor of underlying a-fib (diagnosed at our only other visit together) raises risk of significant side effects with stimulants. Recommended trial of non-stimulant such as Wellbutrin first. He was in agreement. If Wellbutrin does not improve ADHD symptoms, then [facility] referral as next step.  - Adult Mental Health  Referral; Future  - buPROPion (WELLBUTRIN XL) 150 MG 24 hr tablet; Take 1 tablet (150 mg) by mouth every morning for 10 days  - buPROPion (WELLBUTRIN XL) 300 MG 24 hr tablet; Take 1 tablet (300 mg) by mouth every morning    Davon Hernandes MD  [facility]   Abbe is a 57 year old who presents via virtual video visit to discuss his new ADHD diagnosis. This was originally booked as an in-person visit but was changed to virtual due to provider illness. Abbe sent [employer] message with formal psychiatric evaluation attached last month that discusses this new diagnosis. He is interested in discussing medication options.    Review of Systems   Constitutional, cardiovascular, psych systems are negative, except as otherwise noted.      Objective       Vitals: No vitals were obtained today due to virtual visit.    Physical Exam   GENERAL: Healthy, alert and no distress  EYES: Eyes grossly normal to inspection.  No discharge or erythema, or obvious scleral/conjunctival abnormalities.  RESP: No audible wheeze, cough, or visible cyanosis.  No visible retractions or increased work of breathing.    SKIN: Visible skin clear. No significant rash, abnormal pigmentation or  lesions.  NEURO: Cranial nerves grossly intact.  Mentation and speech appropriate for age.  PSYCH: Mentation appears normal, affect normal/bright, judgement and insight intact, normal speech and appearance well-groomed.    Video-Visit Details  Type of service: Video Visit   Originating Location (pt. Location): Home  Distant Location (provider location):  Off-site  Platform used for Video Visit: María

## 2024-02-09 ENCOUNTER — TELEPHONE (OUTPATIENT)
Dept: CARDIOLOGY | Facility: CLINIC | Age: 59
End: 2024-02-09

## 2024-02-09 NOTE — TELEPHONE ENCOUNTER
Hi,    Found this pt in the WQ for a ZP monitor ordered by Branden called pt to see if he still need this but pt unsure asked a msg to be sent to Selene LOVE to ask for her opinion. If still needed please send msg to scheduling pool to do with new orders if no longer needed please remove.    Pt aware nursing/scheduling will only call him back if he still need this.    Ty-Sasi

## 2024-02-10 ENCOUNTER — HEALTH MAINTENANCE LETTER (OUTPATIENT)
Age: 59
End: 2024-02-10

## 2024-03-06 ENCOUNTER — LAB (OUTPATIENT)
Dept: LAB | Facility: CLINIC | Age: 59
End: 2024-03-06

## 2024-03-06 DIAGNOSIS — Z30.2 ENCOUNTER FOR STERILIZATION: ICD-10-CM

## 2024-03-06 LAB
SEMEN ANALYSIS P VAS PNL: NORMAL
SPERM MOTILE SMN QL MICRO: NORMAL

## 2024-03-06 PROCEDURE — 89321 SEMEN ANAL SPERM DETECTION: CPT

## 2024-03-08 DIAGNOSIS — Z30.2 ENCOUNTER FOR STERILIZATION: Primary | ICD-10-CM

## 2024-03-22 DIAGNOSIS — I10 ESSENTIAL HYPERTENSION: ICD-10-CM

## 2024-03-22 RX ORDER — LISINOPRIL 20 MG/1
20 TABLET ORAL DAILY
Qty: 90 TABLET | Refills: 3 | OUTPATIENT
Start: 2024-03-22

## 2024-03-22 NOTE — TELEPHONE ENCOUNTER
Overdue for IN-PERSON diabetes check. Please contact patient to schedule - this can be with any available provider. I'm happy to provide jeff refills once that is scheduled.

## 2024-03-23 DIAGNOSIS — F90.9 ATTENTION DEFICIT HYPERACTIVITY DISORDER (ADHD), UNSPECIFIED ADHD TYPE: ICD-10-CM

## 2024-03-25 RX ORDER — BUPROPION HYDROCHLORIDE 300 MG/1
300 TABLET ORAL EVERY MORNING
Qty: 90 TABLET | Refills: 0 | Status: SHIPPED | OUTPATIENT
Start: 2024-03-25 | End: 2024-06-13

## 2024-06-13 ENCOUNTER — OFFICE VISIT (OUTPATIENT)
Dept: INTERNAL MEDICINE | Facility: CLINIC | Age: 59
End: 2024-06-13
Payer: COMMERCIAL

## 2024-06-13 VITALS
DIASTOLIC BLOOD PRESSURE: 88 MMHG | OXYGEN SATURATION: 97 % | WEIGHT: 300.7 LBS | SYSTOLIC BLOOD PRESSURE: 114 MMHG | BODY MASS INDEX: 39.67 KG/M2 | TEMPERATURE: 97 F | HEART RATE: 52 BPM

## 2024-06-13 DIAGNOSIS — E78.5 DYSLIPIDEMIA: ICD-10-CM

## 2024-06-13 DIAGNOSIS — E66.812 CLASS 2 SEVERE OBESITY DUE TO EXCESS CALORIES WITH SERIOUS COMORBIDITY AND BODY MASS INDEX (BMI) OF 39.0 TO 39.9 IN ADULT (H): ICD-10-CM

## 2024-06-13 DIAGNOSIS — E11.65 TYPE 2 DIABETES MELLITUS WITH HYPERGLYCEMIA, WITHOUT LONG-TERM CURRENT USE OF INSULIN (H): Primary | ICD-10-CM

## 2024-06-13 DIAGNOSIS — Z23 ENCOUNTER FOR IMMUNIZATION: ICD-10-CM

## 2024-06-13 DIAGNOSIS — E66.01 CLASS 2 SEVERE OBESITY DUE TO EXCESS CALORIES WITH SERIOUS COMORBIDITY AND BODY MASS INDEX (BMI) OF 39.0 TO 39.9 IN ADULT (H): ICD-10-CM

## 2024-06-13 DIAGNOSIS — I48.91 ATRIAL FIBRILLATION BY ELECTROCARDIOGRAM (H): ICD-10-CM

## 2024-06-13 DIAGNOSIS — I10 ESSENTIAL HYPERTENSION: ICD-10-CM

## 2024-06-13 DIAGNOSIS — Z12.5 SCREENING FOR PROSTATE CANCER: ICD-10-CM

## 2024-06-13 DIAGNOSIS — Z11.59 NEED FOR HEPATITIS C SCREENING TEST: ICD-10-CM

## 2024-06-13 LAB
ALT SERPL W P-5'-P-CCNC: 23 U/L (ref 0–70)
ANION GAP SERPL CALCULATED.3IONS-SCNC: 11 MMOL/L (ref 7–15)
BASOPHILS # BLD AUTO: 0 10E3/UL (ref 0–0.2)
BASOPHILS NFR BLD AUTO: 0 %
BUN SERPL-MCNC: 16.2 MG/DL (ref 6–20)
CALCIUM SERPL-MCNC: 9.7 MG/DL (ref 8.6–10)
CHLORIDE SERPL-SCNC: 100 MMOL/L (ref 98–107)
CHOLEST SERPL-MCNC: 155 MG/DL
CREAT SERPL-MCNC: 1.08 MG/DL (ref 0.67–1.17)
DEPRECATED HCO3 PLAS-SCNC: 26 MMOL/L (ref 22–29)
EGFRCR SERPLBLD CKD-EPI 2021: 80 ML/MIN/1.73M2
EOSINOPHIL # BLD AUTO: 0.2 10E3/UL (ref 0–0.7)
EOSINOPHIL NFR BLD AUTO: 4 %
ERYTHROCYTE [DISTWIDTH] IN BLOOD BY AUTOMATED COUNT: 12.9 % (ref 10–15)
FASTING STATUS PATIENT QL REPORTED: YES
FASTING STATUS PATIENT QL REPORTED: YES
GLUCOSE SERPL-MCNC: 154 MG/DL (ref 70–99)
HBA1C MFR BLD: 7.5 % (ref 0–5.6)
HCT VFR BLD AUTO: 50.6 % (ref 40–53)
HCV AB SERPL QL IA: NONREACTIVE
HDLC SERPL-MCNC: 32 MG/DL
HGB BLD-MCNC: 17.2 G/DL (ref 13.3–17.7)
IMM GRANULOCYTES # BLD: 0 10E3/UL
IMM GRANULOCYTES NFR BLD: 0 %
LDLC SERPL CALC-MCNC: 97 MG/DL
LYMPHOCYTES # BLD AUTO: 1.2 10E3/UL (ref 0.8–5.3)
LYMPHOCYTES NFR BLD AUTO: 23 %
MCH RBC QN AUTO: 31.4 PG (ref 26.5–33)
MCHC RBC AUTO-ENTMCNC: 34 G/DL (ref 31.5–36.5)
MCV RBC AUTO: 93 FL (ref 78–100)
MONOCYTES # BLD AUTO: 0.8 10E3/UL (ref 0–1.3)
MONOCYTES NFR BLD AUTO: 14 %
NEUTROPHILS # BLD AUTO: 3.2 10E3/UL (ref 1.6–8.3)
NEUTROPHILS NFR BLD AUTO: 59 %
NONHDLC SERPL-MCNC: 123 MG/DL
PLATELET # BLD AUTO: 203 10E3/UL (ref 150–450)
POTASSIUM SERPL-SCNC: 4.7 MMOL/L (ref 3.4–5.3)
PSA SERPL DL<=0.01 NG/ML-MCNC: 0.58 NG/ML (ref 0–3.5)
RBC # BLD AUTO: 5.47 10E6/UL (ref 4.4–5.9)
SODIUM SERPL-SCNC: 137 MMOL/L (ref 135–145)
TRIGL SERPL-MCNC: 129 MG/DL
WBC # BLD AUTO: 5.4 10E3/UL (ref 4–11)

## 2024-06-13 PROCEDURE — 36415 COLL VENOUS BLD VENIPUNCTURE: CPT | Performed by: INTERNAL MEDICINE

## 2024-06-13 PROCEDURE — 99207 PR FOOT EXAM NO CHARGE: CPT | Mod: 25 | Performed by: INTERNAL MEDICINE

## 2024-06-13 PROCEDURE — 85025 COMPLETE CBC W/AUTO DIFF WBC: CPT | Performed by: INTERNAL MEDICINE

## 2024-06-13 PROCEDURE — 90471 IMMUNIZATION ADMIN: CPT | Performed by: INTERNAL MEDICINE

## 2024-06-13 PROCEDURE — 86803 HEPATITIS C AB TEST: CPT | Performed by: INTERNAL MEDICINE

## 2024-06-13 PROCEDURE — 80061 LIPID PANEL: CPT | Performed by: INTERNAL MEDICINE

## 2024-06-13 PROCEDURE — 83036 HEMOGLOBIN GLYCOSYLATED A1C: CPT | Performed by: INTERNAL MEDICINE

## 2024-06-13 PROCEDURE — 82570 ASSAY OF URINE CREATININE: CPT | Performed by: INTERNAL MEDICINE

## 2024-06-13 PROCEDURE — 90715 TDAP VACCINE 7 YRS/> IM: CPT | Performed by: INTERNAL MEDICINE

## 2024-06-13 PROCEDURE — G0103 PSA SCREENING: HCPCS | Performed by: INTERNAL MEDICINE

## 2024-06-13 PROCEDURE — 82043 UR ALBUMIN QUANTITATIVE: CPT | Performed by: INTERNAL MEDICINE

## 2024-06-13 PROCEDURE — 99214 OFFICE O/P EST MOD 30 MIN: CPT | Mod: 25 | Performed by: INTERNAL MEDICINE

## 2024-06-13 PROCEDURE — 84460 ALANINE AMINO (ALT) (SGPT): CPT | Performed by: INTERNAL MEDICINE

## 2024-06-13 PROCEDURE — 80048 BASIC METABOLIC PNL TOTAL CA: CPT | Performed by: INTERNAL MEDICINE

## 2024-06-13 RX ORDER — ATOMOXETINE 80 MG/1
80 CAPSULE ORAL DAILY
COMMUNITY
Start: 2024-06-12

## 2024-06-13 RX ORDER — HYDROCHLOROTHIAZIDE 25 MG/1
25 TABLET ORAL DAILY
Qty: 90 TABLET | Refills: 4 | Status: SHIPPED | OUTPATIENT
Start: 2024-06-13 | End: 2024-07-18

## 2024-06-13 RX ORDER — BUPROPION HYDROCHLORIDE 300 MG/1
300 TABLET ORAL EVERY MORNING
Qty: 90 TABLET | Refills: 4 | Status: SHIPPED | OUTPATIENT
Start: 2024-06-13 | End: 2024-06-13

## 2024-06-13 RX ORDER — LISINOPRIL 20 MG/1
20 TABLET ORAL DAILY
Qty: 90 TABLET | Refills: 4 | Status: SHIPPED | OUTPATIENT
Start: 2024-06-13

## 2024-06-13 NOTE — PATIENT INSTRUCTIONS
- I will send you a message on [employer] when I am able to look at the results of your tests from today    - Get another eye exam    - Stop by our pharmacy downstairs or your preferred pharmacy to discuss obtaining a Prevnar pneumonia shot    - Call [phone] to schedule a follow-up appointment with your cardiology team

## 2024-06-13 NOTE — PROGRESS NOTES
Assessment & Plan   Type 2 diabetes mellitus with hyperglycemia, without long-term current use of insulin (H)  Last checked 13 months ago and A1c was 9.4%. I recommended follow-up visit to discuss medication options but patient unfortunately did not follow-up. He is open to repeating labs today. Foot exam WNL. Needs eye exam, referral placed to patient's preferred clinic.  - Basic metabolic panel; Future  - Hemoglobin A1c; Future  - Albumin Random Urine Quantitative with Creat Ratio; Future  - FOOT EXAM  NO CHARGE [[id number]]  - Adult Eye  Referral; Future    Atrial fibrillation by electrocardiogram (H)  Encouraged him to schedule follow-up visit with cardiology as recommended by that service, contact info printed off for patient today. Continue current medications for now.  - CBC with Platelets & Differential; Future    Essential hypertension  BP at goal. Refilled current medications.  - lisinopril (ZESTRIL) 20 MG tablet; Take 1 tablet (20 mg) by mouth daily  - hydrochlorothiazide (HYDRODIURIL) 25 MG tablet; Take 1 tablet (25 mg) by mouth daily    Dyslipidemia  Fasting lab check today.  - ALT; Future  - Lipid panel reflex to direct LDL Fasting; Future    Screening for prostate cancer  PSA WNL in past.  - Prostate Specific Antigen Screen; Future    Need for hepatitis C screening test  .bplcsc  - Hepatitis C Screen Reflex to HCV RNA Quant and Genotype; Future    Class 2 severe obesity due to excess calories with serious comorbidity and body mass index (BMI) of 39.0 to 39.9 in adult (H)  BMI 39. Weight loss would help with diabetes control.    Encounter for immunization  - TDAP 7+ (ADACEL,BOOSTRIX)    Signed Electronically by:  Davon Hernandes MD, MPH  [facility]  Internal Medicine    The longitudinal plan of care for the diagnosis(es)/condition(s) as documented were addressed during this visit. Due to the added complexity in care, I will continue to support Jose in the  subsequent management and with ongoing continuity of care.    Shivam Garcia is a 58 year old who presents for follow-up on his chronic health conditions. He tells me lost his job a few months ago which has limited his follow-up. He denies any notable heart fluttering or chest pain. Hoping to update labs. Medications going well. No acute concerns.      Objective    /88   Pulse 52   Temp 97  F (36.1  C) (Temporal)   Wt 136.4 kg (300 lb 11.2 oz)   SpO2 97%   BMI 39.67 kg/m    Body mass index is 39.67 kg/m .    Physical Exam   GENERAL: alert and in no distress.  EYES: conjunctivae/corneas clear. EOMs grossly intact  HENT: Facies symmetric.  RESP: CTAB, no w/r/r.  CV: RRR, no m/r/g.  GI: NT, ND, without rebound tenderness or guarding  MSK: No edema. Moves all four extremities freely.  FOOT EXAM: No ulcers/sores. DP pulse 2+. Sensation to monofilament intact.  SKIN: No significant ulcers, lesions, or rashes on the visualized portions of the skin  NEURO: CN II-XII grossly intact.

## 2024-06-14 LAB
CREAT UR-MCNC: 143 MG/DL
MICROALBUMIN UR-MCNC: <12 MG/L
MICROALBUMIN/CREAT UR: NORMAL MG/G{CREAT}

## 2024-07-18 DIAGNOSIS — I10 ESSENTIAL HYPERTENSION: ICD-10-CM

## 2024-07-18 RX ORDER — HYDROCHLOROTHIAZIDE 25 MG/1
25 TABLET ORAL DAILY
Qty: 90 TABLET | Refills: 2 | Status: SHIPPED | OUTPATIENT
Start: 2024-07-18

## 2024-07-19 DIAGNOSIS — E78.5 DYSLIPIDEMIA: ICD-10-CM

## 2024-07-19 RX ORDER — ATORVASTATIN CALCIUM 10 MG/1
10 TABLET, FILM COATED ORAL EVERY MORNING
Qty: 90 TABLET | Refills: 2 | Status: SHIPPED | OUTPATIENT
Start: 2024-07-19

## 2024-08-08 DIAGNOSIS — I48.91 ATRIAL FIBRILLATION BY ELECTROCARDIOGRAM (H): ICD-10-CM

## 2024-08-08 NOTE — LETTER
August 8, 2024       TO: Abbe Fuentes  [id number] [address] [address]       Dear Abbe Fuentes,    We recently received a call from your pharmacy requesting a refill of your medication(s).    Our records indicate that you are due for follow-up with your Heart Care Provider. We will refill your medications for 3 months which will allow you enough time to be seen.    Please call [phone] to schedule your appointment.    Thank you for allowing [facility] to be a part of your health care team and we look forward to seeing you soon.    Thank you,    [facility]

## 2024-09-01 ENCOUNTER — HEALTH MAINTENANCE LETTER (OUTPATIENT)
Age: 59
End: 2024-09-01

## 2024-09-14 DIAGNOSIS — E11.65 TYPE 2 DIABETES MELLITUS WITH HYPERGLYCEMIA, WITHOUT LONG-TERM CURRENT USE OF INSULIN (H): ICD-10-CM

## 2024-11-10 ENCOUNTER — HEALTH MAINTENANCE LETTER (OUTPATIENT)
Age: 59
End: 2024-11-10

## 2024-11-14 ENCOUNTER — OFFICE VISIT (OUTPATIENT)
Dept: INTERNAL MEDICINE | Facility: CLINIC | Age: 59
End: 2024-11-14
Attending: INTERNAL MEDICINE
Payer: COMMERCIAL

## 2024-11-14 VITALS
RESPIRATION RATE: 18 BRPM | WEIGHT: 315 LBS | BODY MASS INDEX: 41.75 KG/M2 | DIASTOLIC BLOOD PRESSURE: 76 MMHG | OXYGEN SATURATION: 97 % | HEART RATE: 64 BPM | HEIGHT: 73 IN | SYSTOLIC BLOOD PRESSURE: 122 MMHG

## 2024-11-14 DIAGNOSIS — E66.01 MORBID OBESITY (H): ICD-10-CM

## 2024-11-14 DIAGNOSIS — Z00.00 ROUTINE GENERAL MEDICAL EXAMINATION AT A HEALTH CARE FACILITY: Primary | ICD-10-CM

## 2024-11-14 DIAGNOSIS — Z23 ENCOUNTER FOR IMMUNIZATION: ICD-10-CM

## 2024-11-14 DIAGNOSIS — E11.65 TYPE 2 DIABETES MELLITUS WITH HYPERGLYCEMIA, WITHOUT LONG-TERM CURRENT USE OF INSULIN (H): ICD-10-CM

## 2024-11-14 DIAGNOSIS — I10 ESSENTIAL HYPERTENSION: ICD-10-CM

## 2024-11-14 DIAGNOSIS — I48.91 ATRIAL FIBRILLATION BY ELECTROCARDIOGRAM (H): ICD-10-CM

## 2024-11-14 LAB
EST. AVERAGE GLUCOSE BLD GHB EST-MCNC: 177 MG/DL
HBA1C MFR BLD: 7.8 % (ref 0–5.6)

## 2024-11-14 PROCEDURE — 91320 SARSCV2 VAC 30MCG TRS-SUC IM: CPT | Performed by: INTERNAL MEDICINE

## 2024-11-14 PROCEDURE — 90673 RIV3 VACCINE NO PRESERV IM: CPT | Performed by: INTERNAL MEDICINE

## 2024-11-14 PROCEDURE — 90472 IMMUNIZATION ADMIN EACH ADD: CPT | Performed by: INTERNAL MEDICINE

## 2024-11-14 PROCEDURE — 36415 COLL VENOUS BLD VENIPUNCTURE: CPT | Performed by: INTERNAL MEDICINE

## 2024-11-14 PROCEDURE — 99396 PREV VISIT EST AGE 40-64: CPT | Mod: 25 | Performed by: INTERNAL MEDICINE

## 2024-11-14 PROCEDURE — 83036 HEMOGLOBIN GLYCOSYLATED A1C: CPT | Performed by: INTERNAL MEDICINE

## 2024-11-14 PROCEDURE — 99214 OFFICE O/P EST MOD 30 MIN: CPT | Mod: 25 | Performed by: INTERNAL MEDICINE

## 2024-11-14 PROCEDURE — 90480 ADMN SARSCOV2 VAC 1/ONLY CMP: CPT | Performed by: INTERNAL MEDICINE

## 2024-11-14 PROCEDURE — 90677 PCV20 VACCINE IM: CPT | Performed by: INTERNAL MEDICINE

## 2024-11-14 PROCEDURE — 90471 IMMUNIZATION ADMIN: CPT | Performed by: INTERNAL MEDICINE

## 2024-11-14 RX ORDER — TIRZEPATIDE 2.5 MG/.5ML
2.5 INJECTION, SOLUTION SUBCUTANEOUS
Qty: 2 ML | Refills: 0 | Status: SHIPPED | OUTPATIENT
Start: 2024-11-14

## 2024-11-14 SDOH — HEALTH STABILITY: PHYSICAL HEALTH: ON AVERAGE, HOW MANY MINUTES DO YOU ENGAGE IN EXERCISE AT THIS LEVEL?: 20 MIN

## 2024-11-14 SDOH — HEALTH STABILITY: PHYSICAL HEALTH: ON AVERAGE, HOW MANY DAYS PER WEEK DO YOU ENGAGE IN MODERATE TO STRENUOUS EXERCISE (LIKE A BRISK WALK)?: 3 DAYS

## 2024-11-14 ASSESSMENT — SOCIAL DETERMINANTS OF HEALTH (SDOH): HOW OFTEN DO YOU GET TOGETHER WITH FRIENDS OR RELATIVES?: ONCE A WEEK

## 2024-11-14 NOTE — PATIENT INSTRUCTIONS
- I will send you a message on [employer]gies when I am able to look at the results of your tests from today    - Be sure to take metformin daily!    - Mounjaro script sent    - Follow-up in 1-2 months for re-check on Mounjaro (virtual okay)

## 2024-11-14 NOTE — PROGRESS NOTES
Preventive Care Visit  [facility]  Davon Hernandes MD, Internal Medicine  Nov 14, 2024    Assessment & Plan   Routine general medical examination at a health care facility  Reviewed PMH. Discussed healthcare maintenance issues, including cancer screenings, relevant immunizations, and cardiac risk factor screenings such as for cholesterol, HTN, and DM.    Type 2 diabetes mellitus with hyperglycemia, without long-term current use of insulin (H)  Tolerating metformin well but forgets to take it with some regularity. Discussed how to get around that with him today. He is interested in GLP-1 agonist therapy to help with diabetes and weight loss. I'm unclear if any are covered by his insurance, however I do think he's an excellent candidate for it. Script for starter dose of Mounjaro sent. Discussed this medication's dosing scheduled and need for follow-up as we adjust dose, encouraged him to make follow-up visit in ~5 weeks to discuss how he's tolerating it. Discussed possible side effects, including n/v, diarrhea, constipation. He'll monitor.   - metFORMIN (GLUCOPHAGE) 500 MG tablet; Take 1 tablet (500 mg) by mouth daily (with dinner).  - Hemoglobin A1c; Future  - MOUNJARO 2.5 MG/0.5ML SOAJ; Inject 0.5 mLs (2.5 mg) subcutaneously every 7 days.    Atrial fibrillation by electrocardiogram (H)  Known issue that I take into account for their medical decisions, no current exacerbations or new concerns. Managed by cardiology. Due for follow-up with that service.    Essential hypertension  BP at goal today.    Morbid obesity (H)  BMI 41. Mounjaro script sent as above.  - MOUNJARO 2.5 MG/0.5ML SOAJ; Inject 0.5 mLs (2.5 mg) subcutaneously every 7 days.    Encounter for immunization  - Pneumococcal 20 Valent Conjugate (Prevnar 20)  - INFLUENZA VACCINE TRIVALENT(FLUBLOK)  - COVID-19 12+ (PFIZER)    BMI  Estimated body mass index is 41.65 kg/m  as calculated from the following:    Height as of this encounter:  "1.854 m (6' 1\").    Weight as of this encounter: 143.2 kg (315 lb 11.2 oz).     Counseling  Appropriate preventive services were addressed with this patient via screening, questionnaire, or discussion as appropriate for fall prevention, nutrition, physical activity, Tobacco-use cessation, social engagement, weight loss and cognition. Checklist reviewing preventive services available has been given to the patient. Reviewed patient's diet, addressing concerns and/or questions. He is at risk for lack of exercise and has been provided with information to increase physical activity for the benefit of his well-being. The patient was instructed to see the dentist every 6 months.     Signed Electronically by:  Davon Hernandes MD, MPH  [facility]  Internal Medicine    Subjective   Jose is a 58 year old presenting for the following: Physical    HPI  Jose presents today for a physical exam. We also discussed his chronic health conditions. Tolerating metformin well. He has not been losing weight like he's hoping to.    Health Care Directive Patient does not have a Health Care Directive        11/14/2024   General Health   How would you rate your overall physical health? (!) FAIR   Feel stress (tense, anxious, or unable to sleep) Only a little      (!) STRESS CONCERN      11/14/2024   Nutrition   Three or more servings of calcium each day? Yes   Diet: Diabetic    Vegetarian/vegan    Breakfast skipped    Gluten-free/reduced   How many servings of fruit and vegetables per day? (!) I DON'T KNOW   How many sweetened beverages each day? 0-1       Multiple values from one day are sorted in reverse-chronological order         11/14/2024   Exercise   Days per week of moderate/strenous exercise 3 days   Average minutes spent exercising at this level 20 min          11/14/2024   Social Factors   Frequency of gathering with friends or relatives Once a week   Worry food won't last until get money to buy " more No   Food not last or not have enough money for food? No   Do you have housing? (Housing is defined as stable permanent housing and does not include staying ouside in a car, in a tent, in an abandoned building, in an overnight shelter, or couch-surfing.) Yes   Are you worried about losing your housing? No   Lack of transportation? No   Unable to get utilities (heat,electricity)? No          2024   Fall Risk   Fallen 2 or more times in the past year? No    Trouble with walking or balance? No        Patient-reported          2024   Dental   Dentist two times every year? (!) NO          2024   TB Screening   Were you born outside of the [place]? Yes      Today's PHQ-2 Score:       2024    10:10 AM   PHQ-2 (  [employer])   Q1: Little interest or pleasure in doing things 0    Q2: Feeling down, depressed or hopeless 0    PHQ-2 Score 0   Q1: Little interest or pleasure in doing things Not at all   Q2: Feeling down, depressed or hopeless Not at all   PHQ-2 Score 0       Patient-reported         2024   Substance Use   Alcohol more than 3/day or more than 7/wk No   Do you use any other substances recreationally? No      Social History     Tobacco Use    Smoking status: Former     Current packs/day: 0.00     Types: Cigarettes     Quit date:      Years since quittin.8    Smokeless tobacco: Never   Vaping Use    Vaping status: Never Used   Substance Use Topics    Alcohol use: Yes     Comment: occasionally    Drug use: Yes     Types: Marijuana     Comment: rarely         2024   One time HIV Screening   Previous HIV test? No          2024   STI Screening   New sexual partner(s) since last STI/HIV test? No      Last PSA:   Prostate Specific Antigen Screen   Date Value Ref Range Status   2024 0.58 0.00 - 3.50 ng/mL Final     ASCVD Risk   The 10-year ASCVD risk score (Redd SINGH, et al., 2019) is: 14.7%    Values used to calculate the score:      Age: 58 years      Sex:  "Male      Is Non- : No      Diabetic: Yes      Tobacco smoker: No      Systolic Blood Pressure: 122 mmHg      Is BP treated: Yes      HDL Cholesterol: 32 mg/dL      Total Cholesterol: 155 mg/dL    Reviewed and updated as needed this visit by Provider   Tobacco  Allergies  Meds  Problems  Med Hx  Surg Hx  Fam Hx             Objective    Exam  /76   Pulse 64   Resp 18   Ht 1.854 m (6' 1\")   Wt 143.2 kg (315 lb 11.2 oz)   HC 18 cm (7.09\")   SpO2 97%   BMI 41.65 kg/m     Estimated body mass index is 41.65 kg/m  as calculated from the following:    Height as of this encounter: 1.854 m (6' 1\").    Weight as of this encounter: 143.2 kg (315 lb 11.2 oz).    Physical Exam  GENERAL: In no distress.  EYES: Conjunctivae/corneas clear. EOMs grossly intact.  HENT: NC/AT, facies symmetric. Neck supple.  RESP: CTAB. No w/r/r.  CV: RRR, no m/r/g.  GI: NT, ND, without rebound or guarding, no CVA tenderness, no hepatomegaly appreciated.  MSK: Moves all four extremities freely.  SKIN: No significant ulcers, lesions or rashes on the visualized portions of the skin  NEURO: Alert. Oriented.  PSYCH: Linear thought process. Speech normal rate and volume. No tangential thoughts, hallucinations, or delusions.  "

## 2024-12-16 DIAGNOSIS — I10 ESSENTIAL HYPERTENSION: ICD-10-CM

## 2024-12-16 DIAGNOSIS — I48.91 ATRIAL FIBRILLATION BY ELECTROCARDIOGRAM (H): ICD-10-CM

## 2024-12-16 RX ORDER — METOPROLOL SUCCINATE 50 MG/1
25 TABLET, EXTENDED RELEASE ORAL DAILY
Qty: 45 TABLET | Refills: 0 | Status: SHIPPED | OUTPATIENT
Start: 2024-12-16

## 2025-02-10 ENCOUNTER — MYC MEDICAL ADVICE (OUTPATIENT)
Dept: CARDIOLOGY | Facility: CLINIC | Age: 60
End: 2025-02-10
Payer: COMMERCIAL

## 2025-02-10 DIAGNOSIS — R00.2 PALPITATIONS: Primary | ICD-10-CM

## 2025-02-10 DIAGNOSIS — I10 ESSENTIAL HYPERTENSION: ICD-10-CM

## 2025-02-10 RX ORDER — HYDROCHLOROTHIAZIDE 25 MG/1
25 TABLET ORAL DAILY
Qty: 90 TABLET | Refills: 2 | OUTPATIENT
Start: 2025-02-10

## 2025-02-28 ENCOUNTER — ORDERS ONLY (AUTO-RELEASED) (OUTPATIENT)
Dept: CARDIOLOGY | Facility: CLINIC | Age: 60
End: 2025-02-28
Payer: COMMERCIAL

## 2025-02-28 DIAGNOSIS — R00.2 PALPITATIONS: ICD-10-CM

## 2025-03-02 ENCOUNTER — HEALTH MAINTENANCE LETTER (OUTPATIENT)
Age: 60
End: 2025-03-02

## 2025-03-11 ENCOUNTER — TELEPHONE (OUTPATIENT)
Dept: CARDIOLOGY | Facility: CLINIC | Age: 60
End: 2025-03-11
Payer: COMMERCIAL

## 2025-03-11 LAB — CV ZIO PRELIM RESULTS: NORMAL

## 2025-03-11 NOTE — TELEPHONE ENCOUNTER
I rhythm calls with abnormal zio results.   Pt in % of the time. -known  Slow AF criteria met. Episode on 3/4/25 at 0218 AM shows asymptomatic AF with rates at 37 bpm for 60s. can be found on page 6 strip 2 of report that is posted.     Per med list:   metoprolol succinate ER (TOPROL XL) 50 MG 24 hr tablet Take 0.5 tablets (25 mg) by mouth daily.     hydrochlorothiazide (HYDRODIURIL) 25 MG tablet TAKE 1 TABLET BY MOUTH EVERY DAY     lisinopril (ZESTRIL) 20 MG tablet Take 1 tablet (20 mg) by mouth daily     Routed to Selene Kuhn, ordering provider.

## 2025-03-12 ENCOUNTER — TELEPHONE (OUTPATIENT)
Dept: CARDIOLOGY | Facility: CLINIC | Age: 60
End: 2025-03-12
Payer: COMMERCIAL

## 2025-03-12 DIAGNOSIS — I48.21 PERMANENT ATRIAL FIBRILLATION (H): Primary | ICD-10-CM

## 2025-03-12 NOTE — TELEPHONE ENCOUNTER
03/12/25 Msg recd from Selene Kuhn, [facility] Heart Ep Nurse  Stop metoprolol, and repeat monitor in 2-4 weeks. Thank you!    Spoke w pt and explained results and recommendations  Med list updated , pt prefers ZP be mailed. Address verified, order entered  KHerroRN 1145 am

## 2025-03-18 DIAGNOSIS — I48.91 ATRIAL FIBRILLATION BY ELECTROCARDIOGRAM (H): ICD-10-CM

## 2025-03-26 ENCOUNTER — ORDERS ONLY (AUTO-RELEASED) (OUTPATIENT)
Dept: CARDIOLOGY | Facility: CLINIC | Age: 60
End: 2025-03-26
Payer: COMMERCIAL

## 2025-03-26 DIAGNOSIS — I48.21 PERMANENT ATRIAL FIBRILLATION (H): ICD-10-CM

## 2025-04-15 ENCOUNTER — OFFICE VISIT (OUTPATIENT)
Dept: OPTOMETRY | Facility: CLINIC | Age: 60
End: 2025-04-15
Payer: COMMERCIAL

## 2025-04-15 DIAGNOSIS — H52.203 MYOPIA OF BOTH EYES WITH ASTIGMATISM: ICD-10-CM

## 2025-04-15 DIAGNOSIS — E11.9 TYPE 2 DIABETES MELLITUS WITHOUT RETINOPATHY (H): Primary | ICD-10-CM

## 2025-04-15 DIAGNOSIS — H52.4 PRESBYOPIA: ICD-10-CM

## 2025-04-15 DIAGNOSIS — H52.13 MYOPIA OF BOTH EYES WITH ASTIGMATISM: ICD-10-CM

## 2025-04-15 PROCEDURE — 92015 DETERMINE REFRACTIVE STATE: CPT | Performed by: OPTOMETRIST

## 2025-04-15 PROCEDURE — 92004 COMPRE OPH EXAM NEW PT 1/>: CPT | Performed by: OPTOMETRIST

## 2025-04-15 PROCEDURE — 2023F DILAT RTA XM W/O RTNOPTHY: CPT | Performed by: OPTOMETRIST

## 2025-04-15 ASSESSMENT — EXTERNAL EXAM - LEFT EYE: OS_EXAM: NORMAL

## 2025-04-15 ASSESSMENT — CONF VISUAL FIELD
OS_INFERIOR_NASAL_RESTRICTION: 0
OD_INFERIOR_TEMPORAL_RESTRICTION: 0
OS_SUPERIOR_TEMPORAL_RESTRICTION: 0
METHOD: COUNTING FINGERS
OS_SUPERIOR_NASAL_RESTRICTION: 0
OD_SUPERIOR_TEMPORAL_RESTRICTION: 0
OS_NORMAL: 1
OD_INFERIOR_NASAL_RESTRICTION: 0
OD_SUPERIOR_NASAL_RESTRICTION: 0
OS_INFERIOR_TEMPORAL_RESTRICTION: 0
OD_NORMAL: 1

## 2025-04-15 ASSESSMENT — TONOMETRY
OS_IOP_MMHG: 19
OD_IOP_MMHG: 19
IOP_METHOD: APPLANATION

## 2025-04-15 ASSESSMENT — REFRACTION_MANIFEST
OS_CYLINDER: +1.25
OD_AXIS: 170
OS_SPHERE: -3.00
OD_SPHERE: -3.25
OS_AXIS: 172
OS_SPHERE: -3.00
OD_AXIS: 172
OD_ADD: +2.75
OS_AXIS: 160
OD_SPHERE: -2.75
METHOD_AUTOREFRACTION: 1
OD_CYLINDER: +1.50
OS_ADD: +2.75
OS_CYLINDER: +1.50
OD_CYLINDER: +1.75

## 2025-04-15 ASSESSMENT — SLIT LAMP EXAM - LIDS
COMMENTS: NORMAL
COMMENTS: NORMAL

## 2025-04-15 ASSESSMENT — VISUAL ACUITY
OS_CC: 20/20
CORRECTION_TYPE: GLASSES
METHOD: SNELLEN - LINEAR
OD_CC: 20/30
OD_CC+: -2
OS_CC: 20/20
OD_CC: 20/20

## 2025-04-15 ASSESSMENT — CUP TO DISC RATIO
OS_RATIO: 0.3
OD_RATIO: 0.3

## 2025-04-15 ASSESSMENT — REFRACTION_WEARINGRX
OS_SPHERE: -2.50
SPECS_TYPE: PAL
OD_SPHERE: -3.00
OS_ADD: +2.50
OD_ADD: +2.50
OS_CYLINDER: +0.75
OD_CYLINDER: +1.50
OD_AXIS: 165
OS_AXIS: 173

## 2025-04-15 ASSESSMENT — EXTERNAL EXAM - RIGHT EYE: OD_EXAM: NORMAL

## 2025-04-15 NOTE — PATIENT INSTRUCTIONS
Patient Education   Diabetes weakens the blood vessels all over the body, including the eyes. Damage to the blood vessels in the eyes can cause swelling or bleeding into part of the eye (called the retina). This is called diabetic retinopathy (PERRI-tin--pu-thee). If not treated, this disease can cause vision loss or blindness.   Symptoms may include blurred or distorted vision, but many people have no symptoms. It's important to see your eye doctor regularly to check for problems.   Early treatment and good control can help protect your vision. Here are the things you can do to help prevent vision loss:      1. Keep your blood sugar levels under tight control.      2. Bring high blood pressure under control.      3. No smoking.      4. Have yearly dilated eye exams.

## 2025-04-15 NOTE — LETTER
4/15/2025      Abbe Fuentes  [address] [address] [facility] [address]      Dear Colleague,    [facility] Please see a copy of my visit note below.    Chief Complaint   Patient presents with     Diabetic Eye Exam       Lab Results   Component Value Date    A1C 7.8 11/14/2024    A1C 7.5 06/13/2024    A1C 9.4 05/23/2023            Last Eye Exam: 2 years ago   Dilated Previously: Yes, side effects of dilation explained today    What are you currently using to see?  glasses    Distance Vision Acuity: Satisfied with vision    Near Vision Acuity: Satisfied with vision while reading and using computer with glasses    Eye Comfort: good  Do you use eye drops? : No      Bell Brown - Optometric Assistant      Medical, surgical and family histories reviewed and updated 4/15/2025.     Fohx dry amd , mother   Former smoker  OBJECTIVE: See Ophthalmology exam    ASSESSMENT:    ICD-10-CM    1. Type 2 diabetes mellitus without retinopathy (H)  E11.9 EYE EXAM (SIMPLE-NONBILLABLE)      2. Myopia of both eyes with astigmatism  H52.13 EYE EXAM (SIMPLE-NONBILLABLE)    H52.203 REFRACTION      3. Presbyopia  H52.4         L lens change only  PLAN:  Improved glucose control     Abbe Fuentes aware  eye exam results will be sent to Davon Mckinnon.    Selina Galloway OD          Again, thank you for allowing me to participate in the care of your patient.        Sincerely,        Selina Galloway, OD    Electronically signed

## 2025-04-15 NOTE — PROGRESS NOTES
Chief Complaint   Patient presents with    Diabetic Eye Exam       Lab Results   Component Value Date    A1C 7.8 11/14/2024    A1C 7.5 06/13/2024    A1C 9.4 05/23/2023            Last Eye Exam: 2 years ago   Dilated Previously: Yes, side effects of dilation explained today    What are you currently using to see?  glasses    Distance Vision Acuity: Satisfied with vision    Near Vision Acuity: Satisfied with vision while reading and using computer with glasses    Eye Comfort: good  Do you use eye drops? : No      Bell Brown - Optometric Assistant      Medical, surgical and family histories reviewed and updated 4/15/2025.     Fohx dry amd , mother   Former smoker  OBJECTIVE: See Ophthalmology exam    ASSESSMENT:    ICD-10-CM    1. Type 2 diabetes mellitus without retinopathy (H)  E11.9 EYE EXAM (SIMPLE-NONBILLABLE)      2. Myopia of both eyes with astigmatism  H52.13 EYE EXAM (SIMPLE-NONBILLABLE)    H52.203 REFRACTION      3. Presbyopia  H52.4         L lens change only  PLAN:  Improved glucose control     Abbe villa  eye exam results will be sent to Davon Mckinnon.    Selina Galloway OD

## 2025-04-17 LAB — CV ZIO PRELIM RESULTS: NORMAL

## 2025-04-18 ENCOUNTER — MYC MEDICAL ADVICE (OUTPATIENT)
Dept: CARDIOLOGY | Facility: CLINIC | Age: 60
End: 2025-04-18
Payer: COMMERCIAL

## 2025-04-28 ENCOUNTER — LAB (OUTPATIENT)
Dept: LAB | Facility: CLINIC | Age: 60
End: 2025-04-28
Payer: COMMERCIAL

## 2025-04-28 DIAGNOSIS — E11.65 TYPE 2 DIABETES MELLITUS WITH HYPERGLYCEMIA, WITHOUT LONG-TERM CURRENT USE OF INSULIN (H): ICD-10-CM

## 2025-04-28 LAB
EST. AVERAGE GLUCOSE BLD GHB EST-MCNC: 177 MG/DL
HBA1C MFR BLD: 7.8 % (ref 0–5.6)

## 2025-04-28 PROCEDURE — 83036 HEMOGLOBIN GLYCOSYLATED A1C: CPT

## 2025-04-28 PROCEDURE — 36415 COLL VENOUS BLD VENIPUNCTURE: CPT

## 2025-05-28 DIAGNOSIS — I10 ESSENTIAL HYPERTENSION: ICD-10-CM

## 2025-05-28 RX ORDER — LISINOPRIL 20 MG/1
20 TABLET ORAL DAILY
Qty: 90 TABLET | Refills: 1 | Status: SHIPPED | OUTPATIENT
Start: 2025-05-28

## 2025-05-30 PROBLEM — M54.50 CHRONIC BILATERAL LOW BACK PAIN WITHOUT SCIATICA: Status: ACTIVE | Noted: 2025-05-30

## 2025-05-30 PROBLEM — G89.29 CHRONIC BILATERAL LOW BACK PAIN WITHOUT SCIATICA: Status: ACTIVE | Noted: 2025-05-30

## 2025-08-02 ENCOUNTER — HEALTH MAINTENANCE LETTER (OUTPATIENT)
Age: 60
End: 2025-08-02

## 2025-08-06 ENCOUNTER — LAB (OUTPATIENT)
Dept: LAB | Facility: CLINIC | Age: 60
End: 2025-08-06
Payer: COMMERCIAL

## 2025-08-06 DIAGNOSIS — E78.5 DYSLIPIDEMIA: ICD-10-CM

## 2025-08-06 DIAGNOSIS — E11.65 TYPE 2 DIABETES MELLITUS WITH HYPERGLYCEMIA, WITHOUT LONG-TERM CURRENT USE OF INSULIN (H): ICD-10-CM

## 2025-08-06 LAB
ANION GAP SERPL CALCULATED.3IONS-SCNC: 11 MMOL/L (ref 7–15)
BUN SERPL-MCNC: 14.2 MG/DL (ref 6–20)
CALCIUM SERPL-MCNC: 9.9 MG/DL (ref 8.8–10.4)
CHLORIDE SERPL-SCNC: 100 MMOL/L (ref 98–107)
CREAT SERPL-MCNC: 1 MG/DL (ref 0.67–1.17)
CREAT UR-MCNC: 216 MG/DL
EGFRCR SERPLBLD CKD-EPI 2021: 87 ML/MIN/1.73M2
EST. AVERAGE GLUCOSE BLD GHB EST-MCNC: 146 MG/DL
GLUCOSE SERPL-MCNC: 124 MG/DL (ref 70–99)
HBA1C MFR BLD: 6.7 % (ref 0–5.6)
HCO3 SERPL-SCNC: 28 MMOL/L (ref 22–29)
MICROALBUMIN UR-MCNC: <12 MG/L
MICROALBUMIN/CREAT UR: NORMAL MG/G{CREAT}
POTASSIUM SERPL-SCNC: 4.2 MMOL/L (ref 3.4–5.3)
SODIUM SERPL-SCNC: 139 MMOL/L (ref 135–145)

## 2025-08-06 PROCEDURE — 80048 BASIC METABOLIC PNL TOTAL CA: CPT

## 2025-08-06 PROCEDURE — 82570 ASSAY OF URINE CREATININE: CPT

## 2025-08-06 PROCEDURE — 36415 COLL VENOUS BLD VENIPUNCTURE: CPT

## 2025-08-06 PROCEDURE — 82043 UR ALBUMIN QUANTITATIVE: CPT

## 2025-08-06 PROCEDURE — 3044F HG A1C LEVEL LT 7.0%: CPT

## 2025-08-06 PROCEDURE — 83036 HEMOGLOBIN GLYCOSYLATED A1C: CPT

## 2025-08-06 RX ORDER — ATORVASTATIN CALCIUM 10 MG/1
10 TABLET, FILM COATED ORAL EVERY MORNING
Qty: 90 TABLET | Refills: 1 | OUTPATIENT
Start: 2025-08-06